# Patient Record
Sex: FEMALE | Race: WHITE | NOT HISPANIC OR LATINO | Employment: OTHER | RURAL
[De-identification: names, ages, dates, MRNs, and addresses within clinical notes are randomized per-mention and may not be internally consistent; named-entity substitution may affect disease eponyms.]

---

## 2020-08-21 ENCOUNTER — HISTORICAL (OUTPATIENT)
Dept: ADMINISTRATIVE | Facility: HOSPITAL | Age: 75
End: 2020-08-21

## 2021-08-27 ENCOUNTER — HOSPITAL ENCOUNTER (OUTPATIENT)
Dept: RADIOLOGY | Facility: HOSPITAL | Age: 76
Discharge: HOME | End: 2021-08-27
Payer: MEDICARE

## 2021-08-27 VITALS — HEIGHT: 64 IN | BODY MASS INDEX: 26.12 KG/M2 | WEIGHT: 153 LBS

## 2021-08-27 DIAGNOSIS — Z12.39 SCREENING FOR BREAST CANCER: ICD-10-CM

## 2021-08-27 PROCEDURE — 77067 SCR MAMMO BI INCL CAD: CPT | Mod: TC

## 2022-01-04 DIAGNOSIS — R21 RASH AND NONSPECIFIC SKIN ERUPTION: Primary | ICD-10-CM

## 2022-09-02 ENCOUNTER — HOSPITAL ENCOUNTER (OUTPATIENT)
Dept: RADIOLOGY | Facility: HOSPITAL | Age: 77
Discharge: HOME | End: 2022-09-02
Attending: FAMILY MEDICINE
Payer: MEDICARE

## 2022-09-02 DIAGNOSIS — Z12.31 ENCOUNTER FOR SCREENING MAMMOGRAM FOR BREAST CANCER: ICD-10-CM

## 2022-09-02 PROCEDURE — 77067 SCR MAMMO BI INCL CAD: CPT | Mod: TC

## 2023-05-16 ENCOUNTER — OFFICE VISIT (OUTPATIENT)
Dept: DERMATOLOGY | Facility: CLINIC | Age: 78
End: 2023-05-16
Payer: MEDICARE

## 2023-05-16 DIAGNOSIS — Z85.828 HISTORY OF NONMELANOMA SKIN CANCER: ICD-10-CM

## 2023-05-16 DIAGNOSIS — D48.5 NEOPLASM OF UNCERTAIN BEHAVIOR OF SKIN: Primary | ICD-10-CM

## 2023-05-16 DIAGNOSIS — L30.9 DERMATITIS, UNSPECIFIED: ICD-10-CM

## 2023-05-16 DIAGNOSIS — L57.0 ACTINIC KERATOSES: ICD-10-CM

## 2023-05-16 PROCEDURE — 17003 DESTRUCT PREMALG LES 2-14: CPT | Mod: ,,, | Performed by: STUDENT IN AN ORGANIZED HEALTH CARE EDUCATION/TRAINING PROGRAM

## 2023-05-16 PROCEDURE — 17003 DESTRUCTION, PREMALIGNANT LESIONS; SECOND THROUGH 14 LESIONS: ICD-10-PCS | Mod: ,,, | Performed by: STUDENT IN AN ORGANIZED HEALTH CARE EDUCATION/TRAINING PROGRAM

## 2023-05-16 PROCEDURE — 88305 TISSUE EXAM BY PATHOLOGIST: CPT | Mod: TC,59,SUR | Performed by: STUDENT IN AN ORGANIZED HEALTH CARE EDUCATION/TRAINING PROGRAM

## 2023-05-16 PROCEDURE — 17000 PR DESTRUCTION(LASER SURGERY,CRYOSURGERY,CHEMOSURGERY),PREMALIGNANT LESIONS,FIRST LESION: ICD-10-PCS | Mod: XS,,, | Performed by: STUDENT IN AN ORGANIZED HEALTH CARE EDUCATION/TRAINING PROGRAM

## 2023-05-16 PROCEDURE — 11103 PR TANGENTIAL BIOPSY, SKIN, EA ADDTL LESION: ICD-10-PCS | Mod: ,,, | Performed by: STUDENT IN AN ORGANIZED HEALTH CARE EDUCATION/TRAINING PROGRAM

## 2023-05-16 PROCEDURE — 88305 TISSUE EXAM BY PATHOLOGIST: CPT | Mod: 26,,, | Performed by: PATHOLOGY

## 2023-05-16 PROCEDURE — 88305 PATHOLOGY, DERMATOLOGY: ICD-10-PCS | Mod: 26,,, | Performed by: PATHOLOGY

## 2023-05-16 PROCEDURE — 99204 PR OFFICE/OUTPT VISIT, NEW, LEVL IV, 45-59 MIN: ICD-10-PCS | Mod: 25,,, | Performed by: STUDENT IN AN ORGANIZED HEALTH CARE EDUCATION/TRAINING PROGRAM

## 2023-05-16 PROCEDURE — 11102 TANGNTL BX SKIN SINGLE LES: CPT | Mod: ,,, | Performed by: STUDENT IN AN ORGANIZED HEALTH CARE EDUCATION/TRAINING PROGRAM

## 2023-05-16 PROCEDURE — 11102 PR TANGENTIAL BIOPSY, SKIN, SINGLE LESION: ICD-10-PCS | Mod: ,,, | Performed by: STUDENT IN AN ORGANIZED HEALTH CARE EDUCATION/TRAINING PROGRAM

## 2023-05-16 PROCEDURE — 87529 MAYO GENERIC ORDERABLE: ICD-10-PCS | Mod: 90,,, | Performed by: CLINICAL MEDICAL LABORATORY

## 2023-05-16 PROCEDURE — 87529 HSV DNA AMP PROBE: CPT | Mod: 90,,, | Performed by: CLINICAL MEDICAL LABORATORY

## 2023-05-16 PROCEDURE — 99204 OFFICE O/P NEW MOD 45 MIN: CPT | Mod: 25,,, | Performed by: STUDENT IN AN ORGANIZED HEALTH CARE EDUCATION/TRAINING PROGRAM

## 2023-05-16 PROCEDURE — 11103 TANGNTL BX SKIN EA SEP/ADDL: CPT | Mod: ,,, | Performed by: STUDENT IN AN ORGANIZED HEALTH CARE EDUCATION/TRAINING PROGRAM

## 2023-05-16 PROCEDURE — 87070 CULTURE, WOUND: ICD-10-PCS | Mod: ,,, | Performed by: CLINICAL MEDICAL LABORATORY

## 2023-05-16 PROCEDURE — 87070 CULTURE OTHR SPECIMN AEROBIC: CPT | Mod: ,,, | Performed by: CLINICAL MEDICAL LABORATORY

## 2023-05-16 PROCEDURE — 17000 DESTRUCT PREMALG LESION: CPT | Mod: XS,,, | Performed by: STUDENT IN AN ORGANIZED HEALTH CARE EDUCATION/TRAINING PROGRAM

## 2023-05-16 RX ORDER — NYSTATIN AND TRIAMCINOLONE ACETONIDE 100000; 1 [USP'U]/G; MG/G
OINTMENT TOPICAL 2 TIMES DAILY
Qty: 60 G | Refills: 3 | Status: SHIPPED | OUTPATIENT
Start: 2023-05-16 | End: 2024-02-15

## 2023-05-16 NOTE — PROGRESS NOTES
[facility]  Ryne Corea MD    [address] [place] [address]  [phone]    Fax: [phone]    Patient Name: Theresa Fam  Medical Record Number: [id number]  PCP: Aden Alfaro MD  Age: 77 y.o. : 1945  Contact: [phone] (home)     CC: rash  History of Present Illness:     Theresa Fam is a 77 y.o.  female with history of skin cancer (20 years ago, NMSC) who presents to clinic today for rash of the mouth.  This has been present for a few months. Symptoms include soreness, tenderness, occasional pruritus. Previous treatments include Abreva, hydrocortisone 2.5%, and Vaseline. Other concerns today are skin lesion of the R nose.     Of note, pt has had patch testing performed with Dr. Baumann with no definitive allergens identified per patient.   The patient has no other concerns today.    Review of Systems:     Unremarkable other than mentioned above.     Physical Exam:     General: Relaxed, oriented, alert    Skin examination of the scalp, face, neck, chest, back, abdomen, upper extremities and lower extremities were normal except for as listed below              Assessment and Plan:     1. History of NMSC   Well-healed scar  No e/o recurrence   Recommend sunscreen and good photoprotection       2. Dermatitis Unspecified  - eczematous patches on upper and lower lip   DDx: intrinsic lip eczema vs ACD vs impetigo    Plan: HSV PCR obtained  - wound culture    - Nystatin- Triamcinolone ointment bid     Counseling  I counseled the patient regarding the following:  Skin care: Patient instructed to use gentle skin care including dove unscented soap, CeraVe moisturizing cream, and fragrance free laundry detergent.  Expectations: The patient understands that there is not a definitive diagnosis at this time. Further testing or empiric therapy may be necessary to diagnose and improve the condition.  Contact office if: The patient develops a fever, or rash dramatically worsens  despite treatment.      3. Actinic Keratoses  Erythematous, scaly papules on L temple, L nasal tip, R arm, L hand and arm     Plan: Liquid Nitrogen.  A total of 8 lesions were treated with liquid nitrogen for 2 freeze-thaw cycles lasting 5 seconds, located on the above locations.   The patient's consent was obtained including but not limited to risks of crusting, scabbing,  blistering, scarring, darker or lighter pigmentary change, recurrence, incomplete removal and infection.    Counseling.  Sun protective clothing and broad spectrum sunscreen can prevent the formation of AK.   AKs can be treated with cryotherapy, photodynamic therapy, imiquimod, topical 5-FU.  Actinic Keratoses are precancerous proliferations that occur within sun damaged skin. If untreated,  a small subset of AKs can develop into Squamous Cell Carcinoma.    4. Neoplasm of Uncertain Behavior   A) R nasal sidewall   - pearly papule located on the R nasal sidewall  Ddx includes: BCC    B) R elbow   - scaly patch on R elbow   BCC vs AK     Shave biopsy  x 2     Pre-procedure Diagnosis: as above  Post_procedure Diagnosis: same  Estimated Blood Loss: <1cc    Findings: None  Complications: None  Specimens: to pathology      Written informed consent was obtained after discussing risks including pain, bleeding, infection, recurrence and scarring. The biopsy site was sterilely prepped with alcohol, which was allowed to dry completely, then locally infiltrated with 1% lidocaine with epinephrine, ~3 cc total. A shave biopsy was obtained using a Dermablade/15 and the specimen was sent to dermatopathology. Aluminum chloride was used for hemostasis. Antibiotic ointment and a clean dressing were applied. The patient tolerated the procedure well without complications. Verbal and written wound care instructions were given.    Ryne Corea MD         Return to clinic in 4 weeks    AVS printed with patient instructions     Ryne Corea MD   Mohs Surgery/Dermatologic  Oncology  Dermatology

## 2023-05-18 LAB
ESTROGEN SERPL-MCNC: NORMAL PG/ML
INSULIN SERPL-ACNC: NORMAL U[IU]/ML
LAB AP GROSS DESCRIPTION: NORMAL
LAB AP LABORATORY NOTES: NORMAL
LAB AP SPEC A DDX: NORMAL
LAB AP SPEC A MORPHOLOGY: NORMAL
LAB AP SPEC A PROCEDURE: NORMAL
LAB AP SPEC B DDX: NORMAL
LAB AP SPEC B MORPHOLOGY: NORMAL
LAB AP SPEC B PROCEDURE: NORMAL
MICROORGANISM SPEC CULT: NORMAL
T3RU NFR SERPL: NORMAL %

## 2023-05-19 LAB — MAYO GENERIC ORDERABLE RESULT: NORMAL

## 2023-05-22 ENCOUNTER — PROCEDURE VISIT (OUTPATIENT)
Dept: DERMATOLOGY | Facility: CLINIC | Age: 78
End: 2023-05-22
Payer: MEDICARE

## 2023-05-22 VITALS — HEART RATE: 72 BPM | SYSTOLIC BLOOD PRESSURE: 131 MMHG | DIASTOLIC BLOOD PRESSURE: 68 MMHG

## 2023-05-22 DIAGNOSIS — D48.9 NEOPLASM OF UNCERTAIN BEHAVIOR: ICD-10-CM

## 2023-05-22 DIAGNOSIS — C44.612 BASAL CELL CARCINOMA (BCC) OF RIGHT ELBOW: ICD-10-CM

## 2023-05-22 DIAGNOSIS — C44.311 BASAL CELL CARCINOMA (BCC) OF RIGHT NASAL SIDEWALL: Primary | ICD-10-CM

## 2023-05-22 PROCEDURE — 99499 UNLISTED E&M SERVICE: CPT | Mod: ,,, | Performed by: STUDENT IN AN ORGANIZED HEALTH CARE EDUCATION/TRAINING PROGRAM

## 2023-05-22 PROCEDURE — 88304 TISSUE EXAM BY PATHOLOGIST: CPT | Mod: TC,SUR | Performed by: STUDENT IN AN ORGANIZED HEALTH CARE EDUCATION/TRAINING PROGRAM

## 2023-05-22 PROCEDURE — 88304 PATHOLOGY, DERMATOLOGY: ICD-10-PCS | Mod: 26,,, | Performed by: PATHOLOGY

## 2023-05-22 PROCEDURE — 17311: ICD-10-PCS | Mod: ,,, | Performed by: STUDENT IN AN ORGANIZED HEALTH CARE EDUCATION/TRAINING PROGRAM

## 2023-05-22 PROCEDURE — 17313: ICD-10-PCS | Mod: XS,,, | Performed by: STUDENT IN AN ORGANIZED HEALTH CARE EDUCATION/TRAINING PROGRAM

## 2023-05-22 PROCEDURE — 14060 PR ADJ TISS XFER LID,NOS,EAR <10 SQCM: ICD-10-PCS | Mod: 51,,, | Performed by: STUDENT IN AN ORGANIZED HEALTH CARE EDUCATION/TRAINING PROGRAM

## 2023-05-22 PROCEDURE — 14060 TIS TRNFR E/N/E/L 10 SQ CM/<: CPT | Mod: 51,,, | Performed by: STUDENT IN AN ORGANIZED HEALTH CARE EDUCATION/TRAINING PROGRAM

## 2023-05-22 PROCEDURE — 12032 PR LAYR CLOS WND TRUNK,ARM,LEG 2.6-7.5 CM: ICD-10-PCS | Mod: XS,51,, | Performed by: STUDENT IN AN ORGANIZED HEALTH CARE EDUCATION/TRAINING PROGRAM

## 2023-05-22 PROCEDURE — 99499 NO LOS: ICD-10-PCS | Mod: ,,, | Performed by: STUDENT IN AN ORGANIZED HEALTH CARE EDUCATION/TRAINING PROGRAM

## 2023-05-22 PROCEDURE — 88304 TISSUE EXAM BY PATHOLOGIST: CPT | Mod: 26,,, | Performed by: PATHOLOGY

## 2023-05-22 PROCEDURE — 17311 MOHS 1 STAGE H/N/HF/G: CPT | Mod: ,,, | Performed by: STUDENT IN AN ORGANIZED HEALTH CARE EDUCATION/TRAINING PROGRAM

## 2023-05-22 PROCEDURE — 17313 MOHS 1 STAGE T/A/L: CPT | Mod: XS,,, | Performed by: STUDENT IN AN ORGANIZED HEALTH CARE EDUCATION/TRAINING PROGRAM

## 2023-05-22 PROCEDURE — 12032 INTMD RPR S/A/T/EXT 2.6-7.5: CPT | Mod: XS,51,, | Performed by: STUDENT IN AN ORGANIZED HEALTH CARE EDUCATION/TRAINING PROGRAM

## 2023-05-22 RX ORDER — MUPIROCIN 20 MG/G
OINTMENT TOPICAL DAILY
Qty: 22 G | Refills: 5 | Status: SHIPPED | OUTPATIENT
Start: 2023-05-22 | End: 2024-02-15

## 2023-05-22 NOTE — PROGRESS NOTES
Mohs Surgery Operative Note    Patient name: Theresa Fam  Date: 05/22/2023    Mohs accession #:   Previous dermpath accession #: [id number]  Location: right nasal sidewall  Preop diagnosis:0.7 x 0.7 cm   Postop diagnosis: Same  Mohs AUC score:   Number of stages: 1  Preop size: 0.7 x 0.7 cm   Postop size: 1.1 x 1.1 cm   Depth of defect: fat   Repair type: Advancement     Surgeon and Pathologist: ALEX Corea MD     Indications for Mohs Surgery    Removal of the patient's tumor is complicated by the following clinical features: Clinical area critical for tissue conservation (Area H: central face, eyelids, eyebrows, nose, lips, chin, ear, periauricular, temple, genitalia, hands, feet, ankles, nail units and areola) and Poorly-defined clinical tumor borders    Based on my medical judgement, Mohs surgery is the most appropriate treatment for this cancer compared to other treatments. I discussed alternative treatments to Mohs surgery and specifically discussed the risks and benefits of curettage, excision with permanent sections, and foregoing treatment. The rationale for Mohs was explained to the patient and consent was obtained. The risks, benefits and alternatives to therapy were discussed in detail. Specifically, the risks of infection, scarring, bleeding, prolonged wound healing, incomplete removal, allergy to anesthesia, nerve injury and recurrence were addressed. Prior to the procedure, the treatment site was clearly identified and confirmed by the patient. All components of Universal Protocol/PAUSE Rule completed. VICTORINA Corea MD operated in two distinct and integrated capacities as the surgeon and pathologist.    STAGE I:  The patient was placed on the operating table. The cancer was identified and outlined. The entire surgical field was prepped with chlorhexidine The surgical site was anesthetized using Lidocaine 1% with epinephrine 1:100,000 buffered with sodium bicarbonate 8.4% in a 1:10 ratio.    The  area of clinically apparent tumor was debulked with a 2 mm curette. The layer of tissue was then surgically excised using a #15 blade and was then transferred onto a specimen sheet maintaining the orientation of the specimen. Hemostasis was obtained using monopolar electrodesiccation. The wound site was then covered with a dressing while the tissue samples were processed for examination.    The specimen was oriented, mapped and divided. Each section was then inked and processed in the Mohs lab using the Mohs protocol and submitted for frozen section. The histopathologic sections were reviewed by the surgeon in conjunction with the reference map.     Total blocks: 1 Total slides: 2    Frozen section analysis revealed: No additional tumor identified on microscopic examination by the surgeon. Histology: No malignant cells seen in the sections examined.    Additional Histologic Findings: none     REPAIR: Advancement Flap    Indication for flap: A flap was chosen because closing the wound by second intention, primary linear closure, or when skin grafting would result in a functionally unsatisfactory result. Additionally, a flap was chosen to recruit additional tissue, displace tension away from the defect and any nearby free margins, as well as to re-orient tension vectors in more favorable directions    Primary Surgeon : ALEX Corea MD  Repair Size: 4 x 2 cm  Sutures:  4-0 monocryl; 5-0 prolene     The defect was identified and a marking pen was used to plan the repair. The area was infiltrated with Lidocaine 1% with epinephrine 1:100,000 buffered with sodium bicarbonate 8.4% in a 1:10 ratio, prepped with chlorhexidine and draped with sterile towels.  The flap was incised using a #15 blade to adipose and undermined widely. The defect was debeveled and undermined and an adjacent standing cone was removed. Hemostasis was obtained using monopolar electrodesiccation. The flap was advanced and secured with buried vertical  mattress sutures placed in the dermis and subcutaneous tissue.  Percutaneous simple running sutures were carefully placed for maximum eversion and meticulous wound edge approximation. Careful attention was paid to avoid distorting any nearby free margins.  The wound was cleansed with saline and ointment was applied along the wound surface. A sterile pressure dressing was applied. Wound care instructions were given verbally and in writing. The patient left the operating suite in stable condition. Patient was informed that additional refinement of the resulting surgical scar may be used as a second stage of this reconstruction.    Ryne Corea MD   Mohs Surgery/Dermatologic Oncology

## 2023-05-22 NOTE — PROGRESS NOTES
Mohs Surgery Operative Note    Patient name: Theresa Fam  Date: 05/22/2023    Mohs accession #:   Previous dermpath accession #: [id number]  Location: right elbow  Preop diagnosis:BCC  Postop diagnosis: Same  Mohs AUC score: 7  Number of stages: 1  Preop size: 2.1 x 2.0   Postop size: 2.6  x 2.5  Depth of defect: Fat   Repair type: Linear      Surgeon and Pathologist: ALEX Corea MD     Indications for Mohs Surgery    Removal of the patient's tumor is complicated by the following clinical features: Large tumor size and Poorly-defined clinical tumor borders    Based on my medical judgement, Mohs surgery is the most appropriate treatment for this cancer compared to other treatments. I discussed alternative treatments to Mohs surgery and specifically discussed the risks and benefits of curettage, excision with permanent sections, and foregoing treatment. The rationale for Mohs was explained to the patient and consent was obtained. The risks, benefits and alternatives to therapy were discussed in detail. Specifically, the risks of infection, scarring, bleeding, prolonged wound healing, incomplete removal, allergy to anesthesia, nerve injury and recurrence were addressed. Prior to the procedure, the treatment site was clearly identified and confirmed by the patient. All components of Universal Protocol/PAUSE Rule completed. VICTORINA Corea MD operated in two distinct and integrated capacities as the surgeon and pathologist.    STAGE I:  The patient was placed on the operating table. The cancer was identified and outlined. The entire surgical field was prepped with chlorhexidine The surgical site was anesthetized using Lidocaine 1% with epinephrine 1:100,000 buffered with sodium bicarbonate 8.4% in a 1:10 ratio.    The area of clinically apparent tumor was debulked with a 2 mm curette. The layer of tissue was then surgically excised using a #15 blade and was then transferred onto a specimen sheet maintaining the  orientation of the specimen. Hemostasis was obtained using monopolar electrodesiccation. The wound site was then covered with a dressing while the tissue samples were processed for examination.    The specimen was oriented, mapped and divided. Each section was then inked and processed in the Mohs lab using the Mohs protocol and submitted for frozen section. The histopathologic sections were reviewed by the surgeon in conjunction with the reference map.     Total blocks: 1 Total slides: 3    Frozen section analysis revealed: No additional tumor identified on microscopic examination by the surgeon. Histology: No malignant cells seen in the sections examined.    Additional Histologic Findings: none     REPAIR: Intermediate Closure    Primary Surgeon : ALEX Corea MD  Repair Size: 6 cm  Sutures:  3-0 PDS; 4-0 prolene     The defect was identified and a marking pen was used to plan the repair. The area was infiltrated with Lidocaine 1% with epinephrine 1:100,000 buffered with sodium bicarbonate 8.4% in a 1:10 ratio, prepped with chlorhexidine and draped with sterile towels.  The wound was debeveled and undermined widely. Cones were excised within relaxed skin tension lines on both sides of the defect. Hemostasis was obtained using monopolar electrodesiccation. The dermis and subcutaneous tissue were then approximated using buried vertical mattress sutures. Percutaneous simple running sutures were carefully placed for maximum eversion and meticulous wound edge approximation. Careful attention was paid to avoid distorting any nearby free margins.  The wound was cleansed with saline and ointment was applied along the wound surface. A sterile pressure dressing was applied. Wound care instructions were given verbally and in writing. The patient left the operating suite in stable condition. Patient was informed that additional refinement of the resulting surgical scar may be used as a second stage of this  reconstruction.    Ryne Corea MD   Mohs Surgery/Dermatologic Oncology

## 2023-05-22 NOTE — PROGRESS NOTES
Mohs Surgery Consult Note    Theresa Fam is a 77 y.o. female who is referred by Dr. Corea for evaluation of a BCC x2 on the R nasal sidewall and R elbow.     Recurrent skin cancer: No    Preoperative Risk Factors:  Current Anticoagulants: No  Endocarditis / Rheumatic Fever hx: No  Immunocompromised: No  Prosthetic joint: No  Congenital heart defect: No  Prosthetic heart valve: No  Diabetic: No  Transplant: No  Pacemaker: No  Defibrillator:  No  Prior problem with local anesthesia: No  Tobacco History: No]  Clindamycin Allergy: No  Pregnant: no     Transmissible Diseases:  HIV No  Hepatitis B or C  No      Exam:  Limited skin exam is normal except for a  BCC x2  located on the R nasal sidewall and R elbow  .    Pathologic Findings:  Accession # [id number]   Diagnosis: BCC x2    Assessment and Plan:  Treatment Options : The various treatment options for skin cancer removal were reviewed with the patient in detail. These include Mohs surgery with its high cure rate, excisional surgery, destructive treatment, radiation therapy, and various topical therapies.  Given the indications and high cure rate, the patient has agreed to proceed with Mohs.  Risks and Benefits : The rationale for Mohs was explained to the patient. The risks and benefits to therapy were discussed in detail. Specifically, the risks of infection, scarring, bleeding, dehiscence, hematoma, prolonged wound healing, incomplete removal, allergy to anesthesia, nerve injury, inability to clear the tumor, and recurrence were addressed. The treatment site was clearly identified and confirmed by the patient.    Plan:  Mohs Micrographic Surgery x 2  Indication for antibiotics: Mupirocin ointment bid x 7 days       Of note, an incidental lipoma was removed from the R arm defect and will be sent in for pathology     Ryne Corea MD   Mohs Surgery/Dermatologic Oncology

## 2023-05-22 NOTE — PATIENT INSTRUCTIONS

## 2023-05-24 LAB
ESTROGEN SERPL-MCNC: NORMAL PG/ML
INSULIN SERPL-ACNC: NORMAL U[IU]/ML
LAB AP GROSS DESCRIPTION: NORMAL
LAB AP LABORATORY NOTES: NORMAL
LAB AP SPEC A DDX: NORMAL
LAB AP SPEC A MORPHOLOGY: NORMAL
LAB AP SPEC A PROCEDURE: NORMAL
T3RU NFR SERPL: NORMAL %

## 2023-05-25 ENCOUNTER — CLINICAL SUPPORT (OUTPATIENT)
Dept: DERMATOLOGY | Facility: CLINIC | Age: 78
End: 2023-05-25
Payer: MEDICARE

## 2023-05-25 DIAGNOSIS — Z51.89 VISIT FOR WOUND CHECK: Primary | ICD-10-CM

## 2023-05-25 NOTE — PROGRESS NOTES
Patient is here for wound check related to suspected drainage. Incision is healing well no s/s of infection noted. Patient is to return to clinic 06/01/23 for SR.    - Olivia'Con Javier LPN/IVC

## 2023-06-01 ENCOUNTER — CLINICAL SUPPORT (OUTPATIENT)
Dept: DERMATOLOGY | Facility: CLINIC | Age: 78
End: 2023-06-01
Payer: MEDICARE

## 2023-06-01 DIAGNOSIS — L57.0 ACTINIC KERATOSES: Primary | ICD-10-CM

## 2023-06-01 DIAGNOSIS — Z48.02 ENCOUNTER FOR REMOVAL OF SUTURES: ICD-10-CM

## 2023-06-01 PROCEDURE — 17000 DESTRUCT PREMALG LESION: CPT | Mod: ,,, | Performed by: STUDENT IN AN ORGANIZED HEALTH CARE EDUCATION/TRAINING PROGRAM

## 2023-06-01 PROCEDURE — 99024 POSTOP FOLLOW-UP VISIT: CPT | Mod: ,,, | Performed by: STUDENT IN AN ORGANIZED HEALTH CARE EDUCATION/TRAINING PROGRAM

## 2023-06-01 PROCEDURE — 17003 DESTRUCTION, PREMALIGNANT LESIONS; SECOND THROUGH 14 LESIONS: ICD-10-PCS | Mod: ,,, | Performed by: STUDENT IN AN ORGANIZED HEALTH CARE EDUCATION/TRAINING PROGRAM

## 2023-06-01 PROCEDURE — 17000 PR DESTRUCTION(LASER SURGERY,CRYOSURGERY,CHEMOSURGERY),PREMALIGNANT LESIONS,FIRST LESION: ICD-10-PCS | Mod: ,,, | Performed by: STUDENT IN AN ORGANIZED HEALTH CARE EDUCATION/TRAINING PROGRAM

## 2023-06-01 PROCEDURE — 17003 DESTRUCT PREMALG LES 2-14: CPT | Mod: ,,, | Performed by: STUDENT IN AN ORGANIZED HEALTH CARE EDUCATION/TRAINING PROGRAM

## 2023-06-01 PROCEDURE — 99024 PR POST-OP FOLLOW-UP VISIT: ICD-10-PCS | Mod: ,,, | Performed by: STUDENT IN AN ORGANIZED HEALTH CARE EDUCATION/TRAINING PROGRAM

## 2023-06-01 NOTE — PROGRESS NOTES
Mohs accession #:   Previous dermpath accession #: [id number]  Location: right elbow  Preop diagnosis:BCC  Postop diagnosis: Same  Mohs AUC score: 7  Number of stages: 1  Preop size: 2.1 x 2.0   Postop size: 2.6  x 2.5  Depth of defect: Fat   Repair type: Linear        Previous dermpath accession #: [id number]  Location: right nasal sidewall  Preop diagnosis:0.7 x 0.7 cm   Postop diagnosis: Same  Mohs AUC score:   Number of stages: 1  Preop size: 0.7 x 0.7 cm   Postop size: 1.1 x 1.1 cm   Depth of defect: fat   Repair type: Advancement     Sutures removed with no complications   RTC in 6 weeks for nurse visit

## 2023-07-13 ENCOUNTER — CLINICAL SUPPORT (OUTPATIENT)
Dept: DERMATOLOGY | Facility: CLINIC | Age: 78
End: 2023-07-13
Payer: MEDICARE

## 2023-07-13 DIAGNOSIS — Z48.89 ENCOUNTER FOR POST SURGICAL WOUND CHECK: Primary | ICD-10-CM

## 2023-07-13 NOTE — PROGRESS NOTES
Date: 05/22/2023     Mohs accession #:   Previous dermpath accession #: [id number]  Location: right elbow  Preop diagnosis:BCC  Postop diagnosis: Same  Mohs AUC score: 7  Number of stages: 1  Preop size: 2.1 x 2.0   Postop size: 2.6  x 2.5  Depth of defect: Fat   Repair type: Linear      Date: 05/22/2023     Mohs accession #:   Previous dermpath accession #: [id number]  Location: right nasal sidewall  Preop diagnosis:0.7 x 0.7 cm   Postop diagnosis: Same  Mohs AUC score:   Number of stages: 1  Preop size: 0.7 x 0.7 cm   Postop size: 1.1 x 1.1 cm   Depth of defect: fat   Repair type: Advancement     Incision sites healed well. Hypertrophic scar on right nasal sidewall. ILK injected to hypertrophic scar.     Plan: Intralesional Kenalog    Treatment Number: 1   Lesions Injected: 1  Medication Injected: 10 mg/cc of Kenalog  Total Volume Injected: 0.5 cc  Lot Number: [id number]  Expiration Date: 9/2024  NDC #: 5917-9686-36  Administered by: Ryne Corea MD     The risks of atrophy were reviewed with the patient.    Itzel Salas RN

## 2023-08-23 ENCOUNTER — OFFICE VISIT (OUTPATIENT)
Dept: OTOLARYNGOLOGY | Facility: CLINIC | Age: 78
End: 2023-08-23
Payer: MEDICARE

## 2023-08-23 VITALS — HEIGHT: 65 IN | WEIGHT: 164 LBS | BODY MASS INDEX: 27.32 KG/M2

## 2023-08-23 DIAGNOSIS — R05.3 CHRONIC COUGH: Primary | ICD-10-CM

## 2023-08-23 DIAGNOSIS — K21.9 GASTROESOPHAGEAL REFLUX DISEASE WITHOUT ESOPHAGITIS: ICD-10-CM

## 2023-08-23 PROCEDURE — 99213 OFFICE O/P EST LOW 20 MIN: CPT | Mod: PBBFAC | Performed by: OTOLARYNGOLOGY

## 2023-08-23 PROCEDURE — 99204 PR OFFICE/OUTPT VISIT, NEW, LEVL IV, 45-59 MIN: ICD-10-PCS | Mod: S$PBB,,, | Performed by: OTOLARYNGOLOGY

## 2023-08-23 PROCEDURE — 99204 OFFICE O/P NEW MOD 45 MIN: CPT | Mod: S$PBB,,, | Performed by: OTOLARYNGOLOGY

## 2023-08-23 RX ORDER — CLINDAMYCIN HYDROCHLORIDE 300 MG/1
300 CAPSULE ORAL 2 TIMES DAILY
Qty: 28 CAPSULE | Refills: 0 | Status: SHIPPED | OUTPATIENT
Start: 2023-08-23 | End: 2024-02-15

## 2023-08-23 NOTE — PROGRESS NOTES
Subjective:       Patient ID: Theresa Fam is a 78 y.o. female.    Chief Complaint: Cough (Patient states she has had a cough since October of 2022. She has been taking antibiotics and protonix.)  Worse last 2 months  Cough  Associated symptoms include a sore throat.     Review of Systems   HENT:  Positive for sore throat.    Respiratory:  Positive for cough.    All other systems reviewed and are negative.      Objective:      Physical Exam  General: NAD  Head: Normocephalic, atraumatic, no facial asymmetry/normal strength,  Ears: Both auricules normal in appearance, w/o deformities tympanic membranes normal external auditory canals normal  Nose: External nose w/o deformities normal turbinates no drainage or inflammation  Oral Cavity: Lips, gums, floor of mouth, tongue hard palate, and buccal mucosa without mass/lesion  Oropharynx: Mucosa pink and moist, soft palate, posterior pharynx and oropharyngeal wall without mass/lesion  Neck: Supple, symmetric, trachea midline, no palpable mass/lesion, no palpable cervical lymphadenopathy  Skin: Warm and dry, no concerning lesions  Respiratory: Respirations even, unlabored   Assessment:       1. Chronic cough    2. Gastroesophageal reflux disease without esophagitis        Plan:       Discussed possible origins of cough most likely related to GERD  will increase protonix cleocin for any pharyngitis   Discussed diet modifications to help reflux     Recommend seeing pulmonary which is already scheduled

## 2023-09-08 ENCOUNTER — HOSPITAL ENCOUNTER (OUTPATIENT)
Dept: RADIOLOGY | Facility: HOSPITAL | Age: 78
Discharge: HOME | End: 2023-09-08
Attending: FAMILY MEDICINE
Payer: MEDICARE

## 2023-09-08 VITALS — HEIGHT: 65 IN | BODY MASS INDEX: 27.16 KG/M2 | WEIGHT: 163 LBS

## 2023-09-08 DIAGNOSIS — Z12.31 OTHER SCREENING MAMMOGRAM: ICD-10-CM

## 2023-09-08 PROCEDURE — 77067 SCR MAMMO BI INCL CAD: CPT | Mod: TC

## 2023-11-25 ENCOUNTER — OFFICE VISIT (OUTPATIENT)
Dept: FAMILY MEDICINE | Facility: CLINIC | Age: 78
End: 2023-11-25
Payer: MEDICARE

## 2023-11-25 VITALS
WEIGHT: 163 LBS | HEIGHT: 65 IN | SYSTOLIC BLOOD PRESSURE: 157 MMHG | TEMPERATURE: 100 F | OXYGEN SATURATION: 95 % | BODY MASS INDEX: 27.16 KG/M2 | DIASTOLIC BLOOD PRESSURE: 80 MMHG | RESPIRATION RATE: 20 BRPM | HEART RATE: 85 BPM

## 2023-11-25 DIAGNOSIS — H10.9 CONJUNCTIVITIS OF BOTH EYES, UNSPECIFIED CONJUNCTIVITIS TYPE: Primary | ICD-10-CM

## 2023-11-25 PROCEDURE — 99204 OFFICE O/P NEW MOD 45 MIN: CPT | Mod: ,,, | Performed by: FAMILY MEDICINE

## 2023-11-25 PROCEDURE — 99204 PR OFFICE/OUTPT VISIT, NEW, LEVL IV, 45-59 MIN: ICD-10-PCS | Mod: ,,, | Performed by: FAMILY MEDICINE

## 2023-11-25 RX ORDER — PANTOPRAZOLE SODIUM 40 MG/1
40 TABLET, DELAYED RELEASE ORAL DAILY
COMMUNITY
Start: 2023-10-03

## 2023-11-25 RX ORDER — MOXIFLOXACIN 5 MG/ML
1 SOLUTION/ DROPS OPHTHALMIC 3 TIMES DAILY
Qty: 3 ML | Refills: 0 | Status: SHIPPED | OUTPATIENT
Start: 2023-11-25 | End: 2023-11-30

## 2023-11-25 RX ORDER — LEVOTHYROXINE SODIUM 50 UG/1
50 TABLET ORAL
COMMUNITY
Start: 2023-09-12

## 2023-11-25 RX ORDER — BENZONATATE 100 MG/1
100 CAPSULE ORAL 3 TIMES DAILY PRN
Qty: 20 CAPSULE | Refills: 0 | Status: SHIPPED | OUTPATIENT
Start: 2023-11-25 | End: 2024-02-15

## 2023-11-25 RX ORDER — OFLOXACIN 3 MG/ML
1 SOLUTION/ DROPS OPHTHALMIC 4 TIMES DAILY
Qty: 10 ML | Refills: 0 | Status: SHIPPED | OUTPATIENT
Start: 2023-11-25 | End: 2023-12-02

## 2023-11-25 RX ORDER — PREDNISONE 10 MG/1
20 TABLET ORAL DAILY
COMMUNITY
Start: 2023-11-20

## 2023-11-25 NOTE — PROGRESS NOTES
Subjective:       Patient ID: Theresa Fam is a 78 y.o. female.    Chief Complaint: Conjunctivitis (Pink eye in Right eye. Currently using OTC gtts, not helping) and Cough (Has chronic cough, currently taking Prednisone)    Conjunctivitis  Associated symptoms include coughing. Pertinent negatives include no abdominal pain, arthralgias, change in bowel habit, chest pain, chills, congestion, diaphoresis, fatigue, fever, headaches, joint swelling, myalgias, nausea, neck pain, numbness, rash, sore throat, vertigo, vomiting or weakness.   Cough  Pertinent negatives include no chest pain, chills, ear pain, fever, headaches, myalgias, postnasal drip, rash, rhinorrhea, sore throat, shortness of breath or wheezing. There is no history of environmental allergies.     Review of Systems   Constitutional:  Negative for activity change, appetite change, chills, diaphoresis, fatigue, fever and unexpected weight change.   HENT:  Negative for nasal congestion, dental problem, drooling, ear discharge, ear pain, facial swelling, hearing loss, mouth sores, nosebleeds, postnasal drip, rhinorrhea, sinus pressure/congestion, sneezing, sore throat, tinnitus, trouble swallowing, voice change and goiter.    Eyes:  Positive for discharge and itching. Negative for photophobia and visual disturbance.   Respiratory:  Positive for cough. Negative for apnea, choking, chest tightness, shortness of breath, wheezing and stridor.    Cardiovascular:  Negative for chest pain, palpitations, leg swelling and claudication.   Gastrointestinal:  Negative for abdominal distention, abdominal pain, anal bleeding, blood in stool, change in bowel habit, constipation, diarrhea, nausea and vomiting.   Endocrine: Negative for cold intolerance, heat intolerance, polydipsia, polyphagia and polyuria.   Genitourinary:  Negative for bladder incontinence, decreased urine volume, difficulty urinating, dysuria, enuresis, flank pain, frequency, hematuria, nocturia, pelvic pain  and urgency.   Musculoskeletal:  Negative for arthralgias, back pain, gait problem, joint swelling, leg pain, myalgias, neck pain, neck stiffness and joint deformity.   Integumentary:  Negative for pallor, rash, wound, breast mass and breast tenderness.   Allergic/Immunologic: Negative for environmental allergies, food allergies and immunocompromised state.   Neurological:  Negative for dizziness, vertigo, tremors, seizures, syncope, facial asymmetry, speech difficulty, weakness, light-headedness, numbness, headaches, memory loss and coordination difficulties.   Hematological:  Negative for adenopathy. Does not bruise/bleed easily.   Psychiatric/Behavioral:  Negative for agitation, behavioral problems, confusion, decreased concentration, dysphoric mood, hallucinations, self-injury, sleep disturbance and suicidal ideas. The patient is not nervous/anxious and is not hyperactive.    Breast: Negative for mass and tenderness        Objective:      Physical Exam  Vitals reviewed.   Constitutional:       Appearance: Normal appearance.   HENT:      Head: Normocephalic and atraumatic.      Right Ear: Tympanic membrane, ear canal and external ear normal.      Left Ear: Tympanic membrane, ear canal and external ear normal.      Nose: Nose normal.      Mouth/Throat:      Mouth: Mucous membranes are moist.      Pharynx: Oropharynx is clear.   Eyes:      General:         Right eye: Discharge present.         Left eye: Discharge present.     Extraocular Movements: Extraocular movements intact.      Pupils: Pupils are equal, round, and reactive to light.   Cardiovascular:      Rate and Rhythm: Normal rate and regular rhythm.      Pulses: Normal pulses.      Heart sounds: Normal heart sounds.   Pulmonary:      Effort: Pulmonary effort is normal.      Breath sounds: Normal breath sounds.   Abdominal:      General: Bowel sounds are normal.      Palpations: Abdomen is soft.   Musculoskeletal:         General: Normal range of motion.       Cervical back: Normal range of motion and neck supple.   Skin:     General: Skin is warm and dry.   Neurological:      General: No focal deficit present.      Mental Status: She is alert. Mental status is at baseline.   Psychiatric:         Mood and Affect: Mood normal.         Behavior: Behavior normal.         Thought Content: Thought content normal.         Judgment: Judgment normal.         Assessment:       1. Conjunctivitis of both eyes, unspecified conjunctivitis type        Plan:     Conjunctivitis of both eyes, unspecified conjunctivitis type    Other orders  -     ofloxacin (OCUFLOX) 0.3 % ophthalmic solution; Place 1 drop into both eyes 4 (four) times daily. for 7 days  Dispense: 10 mL; Refill: 0  -     benzonatate (TESSALON) 100 MG capsule; Take 1 capsule (100 mg total) by mouth 3 (three) times daily as needed for Cough.  Dispense: 20 capsule; Refill: 0

## 2024-01-02 ENCOUNTER — OFFICE VISIT (OUTPATIENT)
Dept: DERMATOLOGY | Facility: CLINIC | Age: 79
End: 2024-01-02
Payer: MEDICARE

## 2024-01-02 DIAGNOSIS — Z85.828 HISTORY OF NONMELANOMA SKIN CANCER: ICD-10-CM

## 2024-01-02 DIAGNOSIS — L57.0 ACTINIC KERATOSES: Primary | ICD-10-CM

## 2024-01-02 DIAGNOSIS — L72.0 MILIA: ICD-10-CM

## 2024-01-02 DIAGNOSIS — L82.1 SEBORRHEIC KERATOSES: ICD-10-CM

## 2024-01-02 DIAGNOSIS — L91.0 HYPERTROPHIC SCAR: ICD-10-CM

## 2024-01-02 PROCEDURE — 17000 DESTRUCT PREMALG LESION: CPT | Mod: ,,, | Performed by: STUDENT IN AN ORGANIZED HEALTH CARE EDUCATION/TRAINING PROGRAM

## 2024-01-02 PROCEDURE — 99213 OFFICE O/P EST LOW 20 MIN: CPT | Mod: 25,,, | Performed by: STUDENT IN AN ORGANIZED HEALTH CARE EDUCATION/TRAINING PROGRAM

## 2024-01-02 PROCEDURE — 11900 INJECT SKIN LESIONS </W 7: CPT | Mod: XS,,, | Performed by: STUDENT IN AN ORGANIZED HEALTH CARE EDUCATION/TRAINING PROGRAM

## 2024-01-02 PROCEDURE — 17003 DESTRUCT PREMALG LES 2-14: CPT | Mod: ,,, | Performed by: STUDENT IN AN ORGANIZED HEALTH CARE EDUCATION/TRAINING PROGRAM

## 2024-01-02 RX ORDER — AZITHROMYCIN 250 MG/1
250 TABLET, FILM COATED ORAL
COMMUNITY
Start: 2023-12-29 | End: 2024-02-22 | Stop reason: ALTCHOICE

## 2024-01-02 NOTE — PROGRESS NOTES
[facility]  Ryne Corea MD    [address] [place], [place] [address] [place] [address]  [phone]    Fax: [phone]    Patient Name: Theresa Fam  Medical Record Number: [id number]  PCP: Aden Alfaro MD  Age: 78 y.o. : 1945  Contact: [phone] (home)     History of Present Illness:     Theresa Fam is a 78 y.o.  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 Patient is concerned with lesion on left cheek today.     The patient has no other concerns today.    Review of Systems:     Unremarkable other than mentioned above.     Physical Exam:     General: Relaxed, oriented, alert    Skin examination of the scalp, face, neck, chest, back, abdomen, upper extremities and lower extremities were normal except for as listed below      Assessment and Plan:     1. 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    3. Seborrheic keratoses   - brown stuck on appearing papules/plaques  - patient educated on benign nature. No treatment necessary unless they become irritated or inflamed     4. Actinic Keratoses  Erythematous, scaly papules on right arm, left hand, left arm    Plan: Liquid Nitrogen.  A total of 5 lesions were treated with liquid nitrogen for 2 freeze-thaw cycles lasting 5 seconds, located on the above locations.   The patient's consent was obtained including but not limited to risks of crusting, scabbing,  blistering, scarring, darker or lighter pigmentary change, recurrence,  incomplete removal and infection.    Counseling.  Sun protective clothing and broad spectrum sunscreen can prevent the formation of AK.   AKs can be treated with cryotherapy, photodynamic therapy, imiquimod, topical 5-FU.  Actinic Keratoses are precancerous proliferations that occur within sun damaged skin. If untreated,  a small subset of AKs can develop into Squamous Cell Carcinoma.    5. Milia   - yellow-white cystic papules located on the right nasal sidewall.    Plan: Extractions  Comedonal Lesions located on the above location were removed with an 11 blade and q-tip. Prior to removal the treatment areas were prepped in the usual fashion. Consent was obtained and risks were reviewed including but not limited to scarring, infection, bleeding, scabbing, incomplete removal, and allergy to anesthesia.    Return to clinic in 6 months.     AVS printed with patient instructions     Ryne Corea MD   Mohs Surgery/Dermatologic Oncology  Dermatology

## 2024-02-07 RX ORDER — CHLORPHENIRAMINE MALEATE AND PHENYLEPHRINE HYDROCHLORIDE 4; 10 MG/1; MG/1
1 TABLET, COATED ORAL 2 TIMES DAILY
COMMUNITY
Start: 2023-08-18

## 2024-02-07 RX ORDER — ALBUTEROL SULFATE 90 UG/1
2 AEROSOL, METERED RESPIRATORY (INHALATION)
COMMUNITY
Start: 2023-11-20 | End: 2024-04-18 | Stop reason: SDUPTHER

## 2024-02-07 RX ORDER — DOXYCYCLINE 100 MG/1
100 CAPSULE ORAL 2 TIMES DAILY
COMMUNITY
Start: 2023-11-28 | End: 2024-02-22 | Stop reason: ALTCHOICE

## 2024-02-07 RX ORDER — CHLOPHEDIANOL HCL AND PYRILAMINE MALEATE 12.5; 12.5 MG/5ML; MG/5ML
5 SOLUTION ORAL 3 TIMES DAILY
COMMUNITY
Start: 2023-12-01 | End: 2024-02-22

## 2024-02-15 ENCOUNTER — OFFICE VISIT (OUTPATIENT)
Dept: INTERNAL MEDICINE | Facility: CLINIC | Age: 79
End: 2024-02-15
Payer: MEDICARE

## 2024-02-15 VITALS
DIASTOLIC BLOOD PRESSURE: 66 MMHG | OXYGEN SATURATION: 97 % | HEIGHT: 65 IN | WEIGHT: 156 LBS | SYSTOLIC BLOOD PRESSURE: 134 MMHG | HEART RATE: 83 BPM | TEMPERATURE: 98 F | RESPIRATION RATE: 16 BRPM | BODY MASS INDEX: 25.99 KG/M2

## 2024-02-15 DIAGNOSIS — K21.9 GASTROESOPHAGEAL REFLUX DISEASE, UNSPECIFIED WHETHER ESOPHAGITIS PRESENT: ICD-10-CM

## 2024-02-15 DIAGNOSIS — Z76.89 ENCOUNTER TO ESTABLISH CARE WITH NEW DOCTOR: Primary | ICD-10-CM

## 2024-02-15 DIAGNOSIS — E03.9 HYPOTHYROIDISM, UNSPECIFIED TYPE: ICD-10-CM

## 2024-02-15 DIAGNOSIS — J82.83 EOSINOPHILIC ASTHMA: ICD-10-CM

## 2024-02-15 PROCEDURE — 99215 OFFICE O/P EST HI 40 MIN: CPT | Mod: PBBFAC | Performed by: INTERNAL MEDICINE

## 2024-02-15 PROCEDURE — 99205 OFFICE O/P NEW HI 60 MIN: CPT | Mod: S$PBB,,, | Performed by: INTERNAL MEDICINE

## 2024-02-15 NOTE — LETTER
AUTHORIZATION FOR RELEASE OF   CONFIDENTIAL INFORMATION    Dear [facility],    We are seeing Theresa Fam, date of birth 1945, in the clinic at [facility]. Ho Brady DO is the patient's PCP. Theresa Fam has an outstanding lab/procedure at the time we reviewed her chart. In order to help keep her health information updated, she has authorized us to request the following medical record(s):        (  )  MAMMOGRAM                                      (  )  COLONOSCOPY      (  )  PAP SMEAR                                          (  )  OUTSIDE LAB RESULTS     (  )  DEXA SCAN                                          (  )  EYE EXAM            (  )  FOOT EXAM                                          (  )  ENTIRE RECORD     (  )  OUTSIDE IMMUNIZATIONS                 ( X )  PFT and any pulmonary records_________         Please fax records to Ho Brady DO, [phone]     If you have any questions, please contact Lita Rangel LPN at [phone].           Patient Name: Theresa Fam  : 1945  Patient Phone #: [phone]

## 2024-02-15 NOTE — LETTER
AUTHORIZATION FOR RELEASE OF   CONFIDENTIAL INFORMATION    Dear [facility],    We are seeing Theresa Fam, date of birth 1945, in the clinic at [facility]. Ho Brady DO is the patient's PCP. Theresa Fam has an outstanding lab/procedure at the time we reviewed her chart. In order to help keep her health information updated, she has authorized us to request the following medical record(s):        (  )  MAMMOGRAM                                      (  )  COLONOSCOPY      (  )  PAP SMEAR                                          (  )  OUTSIDE LAB RESULTS     (  )  DEXA SCAN                                          (  )  EYE EXAM            (  )  FOOT EXAM                                          ( X )  ENTIRE RECORD     (  )  OUTSIDE IMMUNIZATIONS                 (  )  _______________         Please fax records to [facility], Swift, Christopher W, DO, [phone]      If you have any questions, please contact Lita Rangel LPN at [phone].           Patient Name: Theresa Fam  : 1945  Patient Phone #: [phone]

## 2024-02-15 NOTE — PROGRESS NOTES
Subjective     Patient ID: Theresa Fam is a 78 y.o. female.    Chief Complaint: Establish Care and Cough (Ongoing for months, several ED visits. )    The patient is a 78-year-old female the presents today to establish care.  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  All medical care up until this point has been provided outside of our health system so we are unable to see any records.  She states that about 3 years ago she developed this diffuse rash.  She saw several different doctors and ended up at the [facility].  From there she was started on Xolair injections.  About a year ago she started developing a cough and some wheezing.  She was diagnosed with asthma.  She states that she has had PFTs done and she has seen pulmonary in the past.  Over the last 6 months she has been to the ER 3 times.  The latest ER visit was last night.  She believes her asthma was triggered by the cold air.  She developed wheezing and shortness a breath.  She was treated with Solu-Medrol and albuterol inhale and released from the ER.  She states that she is doing better today.  She states that she has these periods like last night.  She will get started on steroids and improves.  Shortly after discontinuing the steroids the exacerbation starts back up.  She was started on Trelegy about 2 weeks ago.  O2 sat is 97% on room air today.  She is up-to-date on mammogram and colonoscopy.  We have discussed age-appropriate vaccines to include pneumonia vaccine, shingles vaccine and RSV.  She is currently afebrile and vital signs are stable.    Cough  Pertinent negatives include no chest pain, chills, ear pain, eye redness, fever, headaches, myalgias, rash, sore throat, shortness of breath or wheezing.     Review of Systems   Constitutional:  Negative for appetite change, chills, fatigue and fever.   HENT:  Negative for nasal congestion, ear pain, hearing loss, sinus pressure/congestion and  sore throat.    Eyes:  Negative for pain, redness and visual disturbance.   Respiratory:  Positive for cough. Negative for apnea, shortness of breath and wheezing.    Cardiovascular:  Negative for chest pain and palpitations.   Gastrointestinal:  Negative for abdominal pain, constipation, diarrhea and nausea.   Endocrine: Negative for cold intolerance, heat intolerance and polyuria.   Genitourinary:  Negative for dysuria and hematuria.   Musculoskeletal:  Negative for arthralgias, back pain, joint swelling, myalgias and neck pain.   Integumentary:  Negative for pallor, rash and wound.   Allergic/Immunologic: Negative for immunocompromised state.   Neurological:  Negative for tremors, seizures, weakness, headaches and memory loss.   Hematological:  Negative for adenopathy.   Psychiatric/Behavioral:  Negative for confusion, dysphoric mood and sleep disturbance. The patient is not nervous/anxious.           Objective     Physical Exam  Vitals and nursing note reviewed.   Constitutional:       General: She is not in acute distress.     Appearance: Normal appearance. She is not ill-appearing.   HENT:      Head: Normocephalic and atraumatic.      Right Ear: External ear normal.      Left Ear: External ear normal.      Nose: Nose normal.      Mouth/Throat:      Pharynx: Oropharynx is clear.   Eyes:      Extraocular Movements: Extraocular movements intact.      Conjunctiva/sclera: Conjunctivae normal.      Pupils: Pupils are equal, round, and reactive to light.   Cardiovascular:      Rate and Rhythm: Normal rate and regular rhythm.      Pulses: Normal pulses.      Heart sounds: Normal heart sounds. No murmur heard.  Pulmonary:      Effort: No respiratory distress.      Breath sounds: Normal breath sounds. No wheezing or rales.   Abdominal:      General: Bowel sounds are normal.      Palpations: Abdomen is soft.   Musculoskeletal:         General: Normal range of motion.      Cervical back: Normal range of motion and neck  supple.      Right lower leg: No edema.      Left lower leg: No edema.   Skin:     General: Skin is warm and dry.      Capillary Refill: Capillary refill takes less than 2 seconds.      Coloration: Skin is not pale.   Neurological:      General: No focal deficit present.      Mental Status: She is alert and oriented to person, place, and time.      Cranial Nerves: No cranial nerve deficit.      Sensory: No sensory deficit.      Motor: No weakness.      Gait: Gait normal.   Psychiatric:         Mood and Affect: Mood normal.         Judgment: Judgment normal.            Assessment and Plan     1. Encounter to establish care with new doctor    2. Gastroesophageal reflux disease, unspecified whether esophagitis present    3. Hypothyroidism, unspecified type    4. Eosinophilic asthma  -     Ambulatory referral/consult to Pulmonology; Future; Expected date: 02/22/2024        1. The patient presents today to establish care.  All care has been done outside of our hospital prior to this.  We are going to try to get medical records so that we do not duplicate any test.  She states she is up-to-date on colonoscopy and mammogram.  She has never had Pneumovax.  Once she is over this acute asthma exacerbation we can look at getting her the pneumonia vaccine.  I have also recommended RSV and shingles vaccine.  These can be done through her pharmacy.    2. GERD-could contribute some to this cough that she has.  She is on Protonix 40 mg daily.  Instructions are to take 30 minutes prior to 1st meal every day.    3. Hypothyroidism-she is on 50 mcg of Synthroid.  We are going to try to get a copy of her medical records to see what her last TSH and free T4 were    4. ? eosinophilic asthma-sounds like this diagnosis was made through [facility].  We are going to try to get a copy of her PFTs and medical records.  She has been on Xolair.  She is made 3 trips to the ER in the last 6 months.  Started on Trelegy 2 weeks  ago.  She is also on Singulair.  Currently on a course of prednisone following last night's ER visit.  We are going to refer her to pulmonology here at her request.     Billing for this encounter is based on time spent which is 62 minutes    No follow-ups on file.

## 2024-02-15 NOTE — LETTER
This communication is flagged as high priority.        AUTHORIZATION FOR RELEASE OF   CONFIDENTIAL INFORMATION    Dear [facility],    We are seeing Theresa Fam, date of birth 1945, in the clinic at [facility]. Ho Brady DO is the patient's PCP. Theresa Fam has an outstanding lab/procedure at the time we reviewed her chart. In order to help keep her health information updated, she has authorized us to request the following medical record(s):        (  )  MAMMOGRAM                                      (  )  COLONOSCOPY      (  )  PAP SMEAR                                          (  )  OUTSIDE LAB RESULTS     (  )  DEXA SCAN                                          (  )  EYE EXAM            (  )  FOOT EXAM                                          (  )  ENTIRE RECORD     (  )  OUTSIDE IMMUNIZATIONS                 ( x )  PFT's and recent office visits, labs, etc with pulmonology.         Please fax records to [facility], Swift, Christopher W, DO, [phone]      If you have any questions, please contact Lita Rangel LPN at [phone].           Patient Name: Theresa Fam  : 1945  Patient Phone #: [phone]

## 2024-02-19 DIAGNOSIS — J82.83 EOSINOPHILIC ASTHMA: Primary | ICD-10-CM

## 2024-02-19 RX ORDER — ALBUTEROL SULFATE 0.83 MG/ML
2.5 SOLUTION RESPIRATORY (INHALATION) EVERY 6 HOURS PRN
Qty: 360 ML | Refills: 2 | Status: SHIPPED | OUTPATIENT
Start: 2024-02-19 | End: 2025-02-18

## 2024-02-19 NOTE — TELEPHONE ENCOUNTER
Spoke to pt spouse and he states that before appt with dr jacobs they had went to er. She was given a rx fro albuterol nebulizer solution. He states the pharmacy did not have the one [facility] sent in. I have spoke to [facility] to confirm that they have the 2.5mg/3ml.

## 2024-02-19 NOTE — TELEPHONE ENCOUNTER
----- Message from John Thompson sent at 2/19/2024  9:41 AM CST -----  Patient's  called stating that the patient just left the emergency room and would like to speak to a nurse in the back about some concerns at   [phone]

## 2024-02-20 NOTE — TELEPHONE ENCOUNTER
Spoke to spouse and let him know medication has been sent in. I apologized for the delay in getting back. Spouse thanked me for getting this done.

## 2024-02-22 ENCOUNTER — OFFICE VISIT (OUTPATIENT)
Dept: PULMONOLOGY | Facility: CLINIC | Age: 79
End: 2024-02-22
Payer: MEDICARE

## 2024-02-22 VITALS
OXYGEN SATURATION: 96 % | BODY MASS INDEX: 26.39 KG/M2 | DIASTOLIC BLOOD PRESSURE: 72 MMHG | WEIGHT: 158.38 LBS | SYSTOLIC BLOOD PRESSURE: 126 MMHG | HEIGHT: 65 IN | HEART RATE: 77 BPM | RESPIRATION RATE: 18 BRPM

## 2024-02-22 DIAGNOSIS — J06.9 RECURRENT URI (UPPER RESPIRATORY INFECTION): ICD-10-CM

## 2024-02-22 DIAGNOSIS — R05.3 CHRONIC COUGH: Primary | ICD-10-CM

## 2024-02-22 DIAGNOSIS — J82.83 EOSINOPHILIC ASTHMA: ICD-10-CM

## 2024-02-22 DIAGNOSIS — J45.991 COUGH VARIANT ASTHMA: ICD-10-CM

## 2024-02-22 DIAGNOSIS — R06.02 SHORTNESS OF BREATH: ICD-10-CM

## 2024-02-22 PROCEDURE — 99204 OFFICE O/P NEW MOD 45 MIN: CPT | Mod: S$PBB,,, | Performed by: STUDENT IN AN ORGANIZED HEALTH CARE EDUCATION/TRAINING PROGRAM

## 2024-02-22 PROCEDURE — 99215 OFFICE O/P EST HI 40 MIN: CPT | Mod: PBBFAC | Performed by: STUDENT IN AN ORGANIZED HEALTH CARE EDUCATION/TRAINING PROGRAM

## 2024-02-22 RX ORDER — CIPROFLOXACIN 500 MG/1
500 TABLET ORAL 2 TIMES DAILY
COMMUNITY
End: 2024-03-22 | Stop reason: ALTCHOICE

## 2024-02-22 RX ORDER — BENZONATATE 100 MG/1
100 CAPSULE ORAL 3 TIMES DAILY PRN
COMMUNITY

## 2024-02-22 NOTE — ASSESSMENT & PLAN NOTE
79 yo F with chronic rhinitis and urticaria on Xolair presents with progression in respiratory symptoms characterized by cough now with 3 ED visits since 01/2024. Differential includes cough variant asthma vs hypereosinophilic syndrome vs UACS. Management and work up as follows:  - obtain CBC, region 6 panel and Ig  - obtain PFT  - obtain CT Chest to assess for chronic cough; prior imaging requested  - cont Trelegy Qday and albuterol nebulizer PRN  - opt  for dehumidification over using humidifier

## 2024-02-22 NOTE — PROGRESS NOTES
[facility]  Pulmonology  NEW VISIT     Patient Name:  Theresa Fam  Primary Care Provider: Ho Brady DO  Date of Service: 02/22/2024  Reason for Referral: Eosinophilic Asthma    Chief Complaint: Cough    SUBJECTIVE   HPI:  Theresa Fam is a 78 y.o. female with asthma, hypothyroidism, chronic urticaria on Xolair and GERD who presents today upon referral with complaints of cough. She is accompanied by her .     Sarwat reports worsening of her cough symptoms in the past 3 months. Her symptoms initially consistent of coughing paroxysms with associated shortness of breath. This evolved to having coughing episodes with no sleep interruption. She has a sensation of drainage at the back of her throat. She also has chest congestion. Her coughing episodes now with goal to relieve phlegm with expectorant a small volume green colored sputum. Her symptoms have progressed in severity requiring presentation to the ED 3 times since the start of this year. Outside records have been reviewed in Care Everywhere. She has improvement of symptoms while on steroid therapy as well as antibiotics and antitussives. She has history of CRSsNP. She follows with Allergy medicine where she is treated with Xolair injections since 2022; last Feb 12th 2024. She uses Trelegy daily now and has just received albuterol nebuilizer mediation and equipment. Her home in the past 3 weeks has undergone maintenance of the HVAC system and cleaning of her carpeted rooms.    Past Medical History:   Diagnosis Date    Asthma        Past Surgical History:   Procedure Laterality Date    BRONCHOSCOPY  01/2024    CHOLECYSTECTOMY         Family History   Problem Relation Age of Onset    Breast cancer Maternal Aunt     Breast cancer Maternal Aunt     Breast cancer Maternal Aunt     Breast cancer Maternal Cousin     Breast cancer Maternal Cousin     Breast cancer Maternal Cousin         Social History     Socioeconomic History    Marital status:  "   Tobacco Use    Smoking status: Never     Passive exposure: Never    Smokeless tobacco: Never   Substance and Sexual Activity    Alcohol use: Never    Drug use: Never    Sexual activity: Yes       Social History     Social History Narrative    Not on file       Review of patient's allergies indicates:   Allergen Reactions    Amoxil [amoxicillin]     Rocephin [ceftriaxone]         Medications: Medications reviewed to include over the counter medications.    Review of Systems: A focused ROS was completed and found to be negative except for that mentioned above.      OBJECTIVE   PHYSICAL EXAM:  Vitals:    02/22/24 0802   BP: 126/72   BP Location: Right arm   Patient Position: Sitting   BP Method: Large (Manual)   Pulse: 77   Resp: 18   SpO2: 96%   Weight: 71.8 kg (158 lb 6.4 oz)   Height: 5' 5" (1.651 m)        GENERAL: NAD  HEENT: normocephalic, non-icteric conjunctivae, moist oral mucosa  RESPIRATORY: faint early expiratory wheeze that clears with coughing episode, no rales or rhonchi  CARDIOVASCULAR: Regular rate and rhythm, no murmurs rubs or gallops.  SKIN: no rash, jaundice, ecchymosis or ulcers  MUSCULOSKELETAL: No clubbing or cyanosis; no pedal edema  NEUROLOGIC: AO ×3, no gross deficits  PSYCH: Normal mood and affect    LABS:  No lab studies available for review at time of visit.    IMAGING:  No dedicated lung imaging available for review at time of visit.  CXR 01/2024 OSH:  FINDINGS:The cardiovascular and mediastinal contours unremarkable.     LUNG FUNCTION TESTING:  None available to review or report      ASSESSMENT & PLAN     1. Chronic cough  Assessment & Plan:  77 yo F with chronic rhinitis and urticaria on Xolair presents with progression in respiratory symptoms characterized by cough now with 3 ED visits since 01/2024. Differential includes cough variant asthma vs hypereosinophilic syndrome vs UACS. Management and work up as follows:  - obtain CBC, region 6 panel and Ig  - obtain PFT  - obtain " CT Chest to assess for chronic cough; prior imaging requested  - cont Trelegy Qday and albuterol nebulizer PRN  - opt  for dehumidification over using humidifier    Orders:  -     Complete PFT w/ bronchodilator; Future  -     CT Chest Without Contrast; Future; Expected date: 02/22/2024  -     Resp Profile, [facility]; Future; Expected date: 02/22/2024    2. Eosinophilic asthma  -     Ambulatory referral/consult to Pulmonology  -     CBC Auto Differential; Future; Expected date: 02/22/2024  -     fluticasone-umeclidin-vilanter (TRELEGY ELLIPTA) 100-62.5-25 mcg DsDv; Take 2 puffs by mouth Daily.  Dispense: 60 each; Refill: 11    3. Shortness of breath  -     CBC Auto Differential; Future; Expected date: 02/22/2024  -     Resp Profile, [facility]; Future; Expected date: 02/22/2024    4. Cough variant asthma  -     Resp Profile, [facility]; Future; Expected date: 02/22/2024  -     fluticasone-umeclidin-vilanter (TRELEGY ELLIPTA) 100-62.5-25 mcg DsDv; Take 2 puffs by mouth Daily.  Dispense: 60 each; Refill: 11    5. Recurrent URI (upper respiratory infection)  -     Immunoglobulins (IgG, IgA, IgM) Quantitative; Future; Expected date: 02/22/2024           Follow up in about 4 weeks (around 3/21/2024) for Routine follow up.      Case was discussed with patient; all questions were answered to patient's satisfaction and patient verbalized understanding.     Angela Alex MD  Pulmonary Medicine  [facility]  Phone: [phone]

## 2024-02-27 ENCOUNTER — TELEPHONE (OUTPATIENT)
Dept: PULMONOLOGY | Facility: CLINIC | Age: 79
End: 2024-02-27
Payer: MEDICARE

## 2024-02-29 ENCOUNTER — TELEPHONE (OUTPATIENT)
Dept: PULMONOLOGY | Facility: CLINIC | Age: 79
End: 2024-02-29
Payer: MEDICARE

## 2024-02-29 NOTE — TELEPHONE ENCOUNTER
Patient picked up samples and brought in her date for last shot of Xolair 2/12/2024, will scan into chart.     No answer when I called patient left voicemail, will have 2 samples Trelegy 100 for patient and asked her to confirm her last Xolair shot.          Per Dr. Alex: I agree with the samples particularly given her recent exacerbations. Let's give her two samples and keep her follow up next month. When you call her, could we please confirm once again when her last Xolair dose was administered. Thank you       We sent in script for Trelegy 100 it was too expensive, per patient Dr. Baumann sent in script for Spriva it  was just as expensive.   Is there something else we can call in that would not be as expensive or would you like to give her a sample? Patient did ask to [facility]. Updated chart to reflect that.   Sent message to Dr. Alex

## 2024-03-08 ENCOUNTER — TELEPHONE (OUTPATIENT)
Dept: PULMONOLOGY | Facility: CLINIC | Age: 79
End: 2024-03-08
Payer: MEDICARE

## 2024-03-08 NOTE — TELEPHONE ENCOUNTER
Per Dr. Alex she needs to contact her PCP, wrote patient back on mychart and advised her of such. 3/11/2024    Mrs. Fam called this morning stating her cold and cough stiffness is coming back. She had stopped the prednisone, tessalon pearls and Ed a hist but is wondering now if she should start them back or if we need to call something different in or if she needed to come in for follow up. Please review and advise. She does have a few of each one of those medications left.   Sent message to Dr. Alex 3/8/2024

## 2024-03-09 DIAGNOSIS — Z71.89 COMPLEX CARE COORDINATION: ICD-10-CM

## 2024-03-11 ENCOUNTER — PATIENT MESSAGE (OUTPATIENT)
Dept: PULMONOLOGY | Facility: CLINIC | Age: 79
End: 2024-03-11
Payer: MEDICARE

## 2024-03-11 ENCOUNTER — TELEPHONE (OUTPATIENT)
Dept: INTERNAL MEDICINE | Facility: CLINIC | Age: 79
End: 2024-03-11
Payer: MEDICARE

## 2024-03-11 ENCOUNTER — OFFICE VISIT (OUTPATIENT)
Dept: PRIMARY CARE CLINIC | Facility: CLINIC | Age: 79
End: 2024-03-11
Payer: MEDICARE

## 2024-03-11 VITALS
DIASTOLIC BLOOD PRESSURE: 60 MMHG | HEART RATE: 88 BPM | OXYGEN SATURATION: 96 % | WEIGHT: 161 LBS | HEIGHT: 65 IN | SYSTOLIC BLOOD PRESSURE: 132 MMHG | TEMPERATURE: 98 F | RESPIRATION RATE: 18 BRPM | BODY MASS INDEX: 26.82 KG/M2

## 2024-03-11 DIAGNOSIS — J82.83 EOSINOPHILIC ASTHMA: Primary | ICD-10-CM

## 2024-03-11 PROCEDURE — 99214 OFFICE O/P EST MOD 30 MIN: CPT | Mod: ,,, | Performed by: NURSE PRACTITIONER

## 2024-03-11 NOTE — TELEPHONE ENCOUNTER
Return call to patient who said her cough had returned. I informed her that unfortunately  was doing hospital rounds this week and had nothing available this week. I did recommend her to be see at [facility] on the first floor. She stated she would consider if the provider she was seeing before  could not see her. I told her that would be fine and if she needed any more assistance to give a call back.

## 2024-03-11 NOTE — TELEPHONE ENCOUNTER
----- Message from Roxanne Sawant sent at 3/11/2024  9:19 AM CDT -----  Patient called stating she need appointment tomorrow or in the morning. She still have a cough from being in the hospital.

## 2024-03-11 NOTE — PROGRESS NOTES
Pt is a 77 y/o WF here as walk in for SOB, chest tightness. Hx eosinophilic asthma and chronic cough. On Trelegy and albuterol rescue inhaler. Also on prednisone 20 mg daily. Has CT chest scheduled in 2 days. Sees Dr. Alex with pulmonology on 3/22/24. On Xolair injections for chronic hives.    Past Medical History:   Diagnosis Date    Asthma      Patient Active Problem List   Diagnosis    Gastroesophageal reflux disease    Hypothyroidism    Eosinophilic asthma    Chronic cough     Review of Systems   Constitutional:  Negative for chills and fever.   Respiratory:  Positive for cough. Negative for shortness of breath.    Cardiovascular:  Negative for chest pain.   Gastrointestinal:  Negative for abdominal pain, blood in stool, constipation, diarrhea, melena, nausea and vomiting.   Skin:  Negative for rash.   Neurological:  Negative for weakness.     Physical Exam  Vitals reviewed. Exam conducted with a chaperone present.   Constitutional:       General: She is not in acute distress.     Appearance: Normal appearance.   HENT:      Head: Normocephalic.      Mouth/Throat:      Mouth: Mucous membranes are moist.      Pharynx: Oropharynx is clear.   Eyes:      General: No scleral icterus.     Pupils: Pupils are equal, round, and reactive to light.   Cardiovascular:      Rate and Rhythm: Normal rate.   Pulmonary:      Effort: Pulmonary effort is normal. No respiratory distress.      Breath sounds: Normal breath sounds.   Abdominal:      General: There is no distension.      Palpations: Abdomen is soft.      Tenderness: There is no abdominal tenderness. There is no guarding or rebound.   Skin:     General: Skin is warm and dry.   Neurological:      General: No focal deficit present.      Mental Status: She is alert and oriented to person, place, and time. Mental status is at baseline.   Psychiatric:         Mood and Affect: Mood normal.         Behavior: Behavior normal.         Thought Content: Thought content normal.          Judgment: Judgment normal.       Plan  Eosinophilic asthma    Breathing tx TID  Continue Prednisone 20 mg daily  May also continue Ed-A-Judy Rivas PRN  CT chest Wednesday    Follow up if symptoms worsen or fail to improve.

## 2024-03-11 NOTE — TELEPHONE ENCOUNTER
----- Message from Franny Paiz sent at 3/11/2024  8:41 AM CDT -----  Patient would like someone to give her a call back about an appointment     Patient call back [phone]

## 2024-03-13 ENCOUNTER — HOSPITAL ENCOUNTER (OUTPATIENT)
Dept: RADIOLOGY | Facility: HOSPITAL | Age: 79
Discharge: HOME | End: 2024-03-13
Attending: STUDENT IN AN ORGANIZED HEALTH CARE EDUCATION/TRAINING PROGRAM
Payer: MEDICARE

## 2024-03-13 DIAGNOSIS — R05.3 CHRONIC COUGH: ICD-10-CM

## 2024-03-13 PROCEDURE — 71250 CT THORAX DX C-: CPT | Mod: TC

## 2024-03-13 PROCEDURE — 71250 CT THORAX DX C-: CPT | Mod: 26,,, | Performed by: STUDENT IN AN ORGANIZED HEALTH CARE EDUCATION/TRAINING PROGRAM

## 2024-03-21 ENCOUNTER — OFFICE VISIT (OUTPATIENT)
Dept: PULMONOLOGY | Facility: CLINIC | Age: 79
End: 2024-03-21
Payer: MEDICARE

## 2024-03-21 ENCOUNTER — CLINICAL SUPPORT (OUTPATIENT)
Dept: PULMONOLOGY | Facility: HOSPITAL | Age: 79
End: 2024-03-21
Attending: STUDENT IN AN ORGANIZED HEALTH CARE EDUCATION/TRAINING PROGRAM
Payer: MEDICARE

## 2024-03-21 VITALS
OXYGEN SATURATION: 94 % | HEART RATE: 93 BPM | RESPIRATION RATE: 18 BRPM | HEIGHT: 65 IN | SYSTOLIC BLOOD PRESSURE: 108 MMHG | DIASTOLIC BLOOD PRESSURE: 60 MMHG | OXYGEN SATURATION: 96 % | WEIGHT: 155 LBS | BODY MASS INDEX: 25.83 KG/M2

## 2024-03-21 DIAGNOSIS — R05.3 CHRONIC COUGH: ICD-10-CM

## 2024-03-21 DIAGNOSIS — J30.2 SEASONAL ALLERGIC RHINITIS DUE TO FUNGAL SPORES: ICD-10-CM

## 2024-03-21 DIAGNOSIS — J45.51 SEVERE PERSISTENT ASTHMA WITH ACUTE EXACERBATION: Primary | ICD-10-CM

## 2024-03-21 DIAGNOSIS — J82.83 EOSINOPHILIC ASTHMA: ICD-10-CM

## 2024-03-21 PROCEDURE — 27100098 HC SPACER

## 2024-03-21 PROCEDURE — 99214 OFFICE O/P EST MOD 30 MIN: CPT | Mod: S$PBB,25,, | Performed by: STUDENT IN AN ORGANIZED HEALTH CARE EDUCATION/TRAINING PROGRAM

## 2024-03-21 PROCEDURE — 94060 EVALUATION OF WHEEZING: CPT

## 2024-03-21 PROCEDURE — 94729 DIFFUSING CAPACITY: CPT

## 2024-03-21 PROCEDURE — 94726 PLETHYSMOGRAPHY LUNG VOLUMES: CPT

## 2024-03-21 PROCEDURE — 99214 OFFICE O/P EST MOD 30 MIN: CPT | Mod: PBBFAC,25 | Performed by: STUDENT IN AN ORGANIZED HEALTH CARE EDUCATION/TRAINING PROGRAM

## 2024-03-21 RX ORDER — FLUTICASONE PROPIONATE 50 MCG
1 SPRAY, SUSPENSION (ML) NASAL DAILY
Qty: 18.2 ML | Refills: 11 | Status: SHIPPED | OUTPATIENT
Start: 2024-03-21

## 2024-03-21 RX ORDER — CETIRIZINE HYDROCHLORIDE 10 MG/1
10 TABLET ORAL DAILY
Qty: 30 TABLET | Refills: 3 | Status: SHIPPED | OUTPATIENT
Start: 2024-03-21 | End: 2024-04-18 | Stop reason: SDUPTHER

## 2024-03-21 RX ORDER — PREDNISONE 50 MG/1
50 TABLET ORAL DAILY
Qty: 7 TABLET | Refills: 0 | Status: SHIPPED | OUTPATIENT
Start: 2024-03-21 | End: 2024-04-18

## 2024-03-21 NOTE — PROGRESS NOTES
[facility]  Pulmonology  ESTABLISHED VISIT     Patient Name:  Theresa Fam  Primary Care Provider: Ho Brady DO  Date of Service: 03/21/2024    Chief Complaint: Cough    SUBJECTIVE   HPI:  Theresa Fam is a 78 y.o. female with eosinophilic asthma, hypothyroidism, chronic urticaria on Xolair and GERD who presents for follow up of cough. Last seen 02/2024 with plan for CT Chest and PFT. She is accompanied by her .     Sarwat reports having an acute episode overnight with coughing paroxysms and breathlessness. She had to use her nebulizer twice. Her symptoms were worrisome enough for her  to consider bringing her to the ED. She had improvement with breathing treatments. She remains short of breath today.   Review of OSH records notable for her undergoing bronchoscopy 01/05/2024 with culture growing Haemophilus parainfluenza.     Initial HPI  Sarwat reports worsening of her cough symptoms in the past 3 months. Her symptoms initially consistent of coughing paroxysms with associated shortness of breath. This evolved to having coughing episodes with no sleep interruption. She has a sensation of drainage at the back of her throat. She also has chest congestion. Her coughing episodes now with goal to relieve phlegm with expectorant a small volume green colored sputum. Her symptoms have progressed in severity requiring presentation to the ED 3 times since the start of this year. Outside records have been reviewed in Care Everywhere. She has improvement of symptoms while on steroid therapy as well as antibiotics and antitussives. She has history of CRSsNP. She follows with Allergy medicine where she is treated with Xolair injections since 2022; last Feb 12th 2024. She uses Trelegy daily now and has just received albuterol nebuilizer mediation and equipment. Her home in the past 3 weeks has undergone maintenance of the HVAC system and cleaning of her carpeted rooms.    Past Medical History:  "  Diagnosis Date    Asthma        Past Surgical History:   Procedure Laterality Date    BRONCHOSCOPY  01/2024    CHOLECYSTECTOMY         Family History   Problem Relation Age of Onset    Breast cancer Maternal Aunt     Breast cancer Maternal Aunt     Breast cancer Maternal Aunt     Breast cancer Maternal Cousin     Breast cancer Maternal Cousin     Breast cancer Maternal Cousin         Social History     Socioeconomic History    Marital status:    Tobacco Use    Smoking status: Never     Passive exposure: Never    Smokeless tobacco: Never   Substance and Sexual Activity    Alcohol use: Never    Drug use: Never    Sexual activity: Yes     Partners: Male       Social History     Social History Narrative    Not on file       Review of patient's allergies indicates:   Allergen Reactions    Amoxil [amoxicillin]     Rocephin [ceftriaxone]         Medications: Medications reviewed to include over the counter medications.    Review of Systems: A focused ROS was completed and found to be negative except for that mentioned above.      OBJECTIVE   PHYSICAL EXAM:  Vitals:    03/21/24 1128   BP: 108/60   BP Location: Left arm   Patient Position: Sitting   BP Method: Medium (Manual)   Pulse: 93   Resp: 18   SpO2: (!) 94%   Weight: 70.3 kg (155 lb)   Height: 5' 5" (1.651 m)        GENERAL: NAD  HEENT: normocephalic, non-icteric conjunctivae, moist oral mucosa  RESPIRATORY: bilateral expiratory wheeze, no rales or rhonchi, recurrent coughing episode during encounter  CARDIOVASCULAR: Regular rate and rhythm, no murmurs rubs or gallops.  SKIN: no rash, jaundice, ecchymosis or ulcers  MUSCULOSKELETAL: No clubbing or cyanosis; no pedal edema  NEUROLOGIC: AO ×3, no gross deficits    LABS:  Lab studies reviewed and notable for IgE 432, SEos 580, Ig Quant panel (IgG,A,M) wnl  OS CBC 02/2024:  H/H 14.3/44.2, WBC 7.56, serum eosinophils 1000 (13.4%); , , T bili 0.40, , creatinine 1.10, BNP " 14.3    IMAGING:  Imaging reviewed and notable for CT Chest 03/2024 with Lingular scarring over fissure with mild cylindical bronchiectasis, no masses,  no emphysema, no pleural effusion       CXR 01/2024 OSH:  FINDINGS:The cardiovascular and mediastinal contours unremarkable.     OSH CXR 02/2024: Heart size is within normal limits.  Chronic change of lung with a focal consolidation, pleural effusion or pneumothorax.  Visualized osseous with surrounding soft tissue structures appear grossly unchanged.    LUNG FUNCTION TESTING:    SPIROMETRY/PFT:  03/2024 Pre Post   FVC 1.84/-1.86 2.27/-0.94   FEV1 1.01/-2.84 1.46/-1.69   FEV1/FVC 55/-2.44 64/-1.52   TLC 3.50/-2.59    FRC  2.05/-1.47    RV 1.34/-2.17    DLCO 13.11/-2.31    NOTE: PFT while in acute exacerbation      ASSESSMENT & PLAN     1. Severe persistent asthma with acute exacerbation  -     predniSONE (DELTASONE) 50 MG Tab; Take 1 tablet (50 mg total) by mouth once daily. for 7 days  Dispense: 7 tablet; Refill: 0  -     montelukast (SINGULAIR) 10 mg tablet; Take 1 tablet (10 mg total) by mouth every evening.  Dispense: 30 tablet; Refill: 0    2. Seasonal allergic rhinitis due to fungal spores  -     cetirizine (ZYRTEC) 10 MG tablet; Take 1 tablet (10 mg total) by mouth once daily.  Dispense: 30 tablet; Refill: 3  -     fluticasone propionate (FLONASE) 50 mcg/actuation nasal spray; 1 spray (50 mcg total) by Each Nostril route once daily.  Dispense: 18.2 mL; Refill: 11    3. Eosinophilic asthma  Assessment & Plan:  79 yo F with chronic rhinitis and urticaria on Xolair presents with progression in respiratory symptoms characterized by cough a/w significant dsypnea now with 3 ED visits since 01/2024. Work up has included lab work notable for IgE 432, SEos 580 with last Xolair dose 02/2024. She had a bronch with BAL at OSH 01/2024 with Cx growing Haemophilus parainfluenza. She presents today in acute exacerbation having performed PFT with impressive BD response  despite overnight KVNG use. CT Chest 03/2024 with chronic appearing lingula scar with no other overt evidence of airway or parenchymal disease. Constellation of symptoms is consistent with eosinophilic asthma.   - start steroid burst with Prednisone QDay  - cont Trelegy Qday with KVNG PRN   -- samples given  - start montelukast Qday  - start flonase Qday and cetirizine QDay  - will discuss with Allergy team, Dr. Baumann for consideration of transition of therapy; initially on Xolair for urticaria  - stopped humidifier use; educated that if opt to use, sterile/boiled water as source               Follow up in about 4 weeks (around 4/18/2024).      Case was discussed with patient; all questions were answered to patient's satisfaction and patient verbalized understanding.     Angela Alex MD  Pulmonary Medicine  [facility]  Phone: [phone]

## 2024-03-22 PROCEDURE — 94726 PLETHYSMOGRAPHY LUNG VOLUMES: CPT | Mod: 26,,, | Performed by: STUDENT IN AN ORGANIZED HEALTH CARE EDUCATION/TRAINING PROGRAM

## 2024-03-22 PROCEDURE — 94060 EVALUATION OF WHEEZING: CPT | Mod: 26,,, | Performed by: STUDENT IN AN ORGANIZED HEALTH CARE EDUCATION/TRAINING PROGRAM

## 2024-03-22 PROCEDURE — 94729 DIFFUSING CAPACITY: CPT | Mod: 26,,, | Performed by: STUDENT IN AN ORGANIZED HEALTH CARE EDUCATION/TRAINING PROGRAM

## 2024-03-22 RX ORDER — MONTELUKAST SODIUM 10 MG/1
10 TABLET ORAL NIGHTLY
Qty: 30 TABLET | Refills: 0 | Status: SHIPPED | OUTPATIENT
Start: 2024-03-22 | End: 2024-04-18 | Stop reason: SDUPTHER

## 2024-03-22 NOTE — ASSESSMENT & PLAN NOTE
77 yo F with chronic rhinitis and urticaria on Xolair presents with progression in respiratory symptoms characterized by cough a/w significant dsypnea now with 3 ED visits since 01/2024. Work up has included lab work notable for IgE 432, SEos 580 with last Xolair dose 02/2024. She had a bronch with BAL at OSH 01/2024 with Cx growing Haemophilus parainfluenza. She presents today in acute exacerbation despite Prednisone 20 Qday having performed PFT with impressive BD response despite overnight KVNG use. CT Chest 03/2024 with chronic appearing lingula scar with no other overt evidence of airway or parenchymal disease. Constellation of symptoms is consistent with eosinophilic asthma.   - start steroid burst with Prednisone QDay  - cont Trelegy Qday with KVNG PRN   -- samples given  - start montelukast Qday  - start flonase Qday and cetirizine QDay  - will discuss with Allergy team, Dr. Baumann for consideration of transition of therapy; initially on Xolair for urticaria  - stopped humidifier use; educated that if opt to use, sterile/boiled water as source

## 2024-03-22 NOTE — PROCEDURES
PFT REPORT:  SPIROMETRY:  The pre-BD FVC is decreased, 1.84L/-1.86 Z score.  The pre-BD FEV1 is decreased, 1.01L/-2.84 Z score.  Thre pre-BD FEV1/FVC ratio is 55/-2.44 Z score.    The post-BD FVC is decreased, 2.27L/-0.94 Z score.  The post-BD FEV1 is decreased, 1.46L/-1.69 Z score.  The post-BD FEV1/FVC ratio is 64/-1.52 Z score.    The is a significant bronchodilator effect.  The flow volume loop is normal.    LUNG VOLUMES:  The TLC is decreased, 3.50L/-2.59 Z score.  The FRC is normal, 2.05L/-1.47 Z score.  The RV is decreased, 1.34L/-2.17 Z score.    DIFFUSION CAPACITY:  The diffusion capacity is corrected for hemoglobin and is decreased, 13.11/-2.31 Z score.    Interpretation:  The post-BD FEV1/FVC ratio is low based on GOLD guidelines for COPD and normal based on the lower limit of normal (LLN) cutoff. In a patient with risk factors for COPD, would consider COPD. If COPD is not suspected, ratio is likely within normal range without obstruction. If COPD is suspected, the post-BD spirometry shoes a moderate obstructive defect.    There is significant and robust response to bronchodilators.   The flow volume loop is normal post bronchodilator.   There is a moderate restrictive defect.  There is a mild diffusion defect, however, not acceptable for Vi 75% VC    Recommend repeating PFTs; patient is known to physician interpreting PFT and in acute exacerbation at time of testing.    Angela Alex MD  Pulmonary Medicine  [facility]

## 2024-03-28 ENCOUNTER — TELEPHONE (OUTPATIENT)
Dept: PULMONOLOGY | Facility: CLINIC | Age: 79
End: 2024-03-28
Payer: MEDICARE

## 2024-04-08 RX ORDER — PANTOPRAZOLE SODIUM 40 MG/1
40 TABLET, DELAYED RELEASE ORAL DAILY
Qty: 90 TABLET | Refills: 3 | Status: SHIPPED | OUTPATIENT
Start: 2024-04-08

## 2024-04-08 NOTE — TELEPHONE ENCOUNTER
----- Message from Roxanne Sawant sent at 4/8/2024 10:30 AM CDT -----  Need refill on PROTONIX 90 day supply

## 2024-04-18 ENCOUNTER — OFFICE VISIT (OUTPATIENT)
Dept: PULMONOLOGY | Facility: CLINIC | Age: 79
End: 2024-04-18
Payer: MEDICARE

## 2024-04-18 VITALS
SYSTOLIC BLOOD PRESSURE: 134 MMHG | DIASTOLIC BLOOD PRESSURE: 78 MMHG | RESPIRATION RATE: 16 BRPM | HEART RATE: 63 BPM | HEIGHT: 65 IN | WEIGHT: 155 LBS | OXYGEN SATURATION: 95 % | BODY MASS INDEX: 25.83 KG/M2

## 2024-04-18 DIAGNOSIS — J30.2 SEASONAL ALLERGIC RHINITIS DUE TO FUNGAL SPORES: ICD-10-CM

## 2024-04-18 DIAGNOSIS — J82.83 EOSINOPHILIC ASTHMA: Primary | ICD-10-CM

## 2024-04-18 DIAGNOSIS — J45.50 SEVERE PERSISTENT ASTHMA WITHOUT COMPLICATION: ICD-10-CM

## 2024-04-18 PROCEDURE — 99214 OFFICE O/P EST MOD 30 MIN: CPT | Mod: PBBFAC | Performed by: STUDENT IN AN ORGANIZED HEALTH CARE EDUCATION/TRAINING PROGRAM

## 2024-04-18 PROCEDURE — 99214 OFFICE O/P EST MOD 30 MIN: CPT | Mod: S$PBB,,, | Performed by: STUDENT IN AN ORGANIZED HEALTH CARE EDUCATION/TRAINING PROGRAM

## 2024-04-18 RX ORDER — ALBUTEROL SULFATE 90 UG/1
2 AEROSOL, METERED RESPIRATORY (INHALATION)
Qty: 18 G | Refills: 11 | Status: SHIPPED | OUTPATIENT
Start: 2024-04-18 | End: 2025-04-18

## 2024-04-18 RX ORDER — CETIRIZINE HYDROCHLORIDE 10 MG/1
10 TABLET ORAL DAILY
Qty: 30 TABLET | Refills: 11 | Status: SHIPPED | OUTPATIENT
Start: 2024-04-18 | End: 2024-05-15 | Stop reason: SDUPTHER

## 2024-04-18 RX ORDER — MONTELUKAST SODIUM 10 MG/1
10 TABLET ORAL NIGHTLY
Qty: 30 TABLET | Refills: 11 | Status: SHIPPED | OUTPATIENT
Start: 2024-04-18 | End: 2025-04-13

## 2024-04-18 NOTE — PROGRESS NOTES
[facility]  Pulmonology  ESTABLISHED VISIT     Patient Name:  Theresa Fam  Primary Care Provider: Ho Brady DO  Date of Service: 04/18/2024    Chief Complaint: Cough    SUBJECTIVE   HPI:  Theresa Fam is a 78 y.o. female with eosinophilic asthma, hypothyroidism, chronic urticaria on Xolair and GERD who presents for follow up of cough. Last seen 03/2024 with plan for transition of biologice therapy following discussion with Dr. Baumann (Now on Tezpire). She is accompanied by her .     Sarwat reports improvement in her breathing since last month. She has completed her steroid burst for asthma exacerbation (last exacerbation was 03/08/2024). She was started on Tezspire. She has had no ED or urgent care presentations since last seen.     Initial HPI  Sarwat reports worsening of her cough symptoms in the past 3 months. Her symptoms initially consistent of coughing paroxysms with associated shortness of breath. This evolved to having coughing episodes with no sleep interruption. She has a sensation of drainage at the back of her throat. She also has chest congestion. Her coughing episodes now with goal to relieve phlegm with expectorant a small volume green colored sputum. Her symptoms have progressed in severity requiring presentation to the ED 3 times since the start of this year. Outside records have been reviewed in Care Everywhere. She has improvement of symptoms while on steroid therapy as well as antibiotics and antitussives. She has history of CRSsNP. She follows with Allergy medicine where she is treated with Xolair injections since 2022; last Feb 12th 2024. She uses Trelegy daily now and has just received albuterol nebuilizer mediation and equipment. Her home in the past 3 weeks has undergone maintenance of the HVAC system and cleaning of her carpeted rooms.  03/2024: reports having an acute episode overnight with coughing paroxysms and breathlessness. She had to use her nebulizer twice.  "Her symptoms were worrisome enough for her  to consider bringing her to the ED. She had improvement with breathing treatments. She remains short of breath today.   Review of OSH records notable for her undergoing bronchoscopy 01/05/2024 with culture growing Haemophilus parainfluenza.     Past Medical History:   Diagnosis Date    Asthma        Past Surgical History:   Procedure Laterality Date    BRONCHOSCOPY  01/2024    CHOLECYSTECTOMY         Family History   Problem Relation Name Age of Onset    Breast cancer Maternal Aunt      Breast cancer Maternal Aunt      Breast cancer Maternal Aunt      Breast cancer Maternal Cousin      Breast cancer Maternal Cousin      Breast cancer Maternal Cousin          Social History     Socioeconomic History    Marital status:    Tobacco Use    Smoking status: Never     Passive exposure: Never    Smokeless tobacco: Never   Substance and Sexual Activity    Alcohol use: Never    Drug use: Never    Sexual activity: Yes     Partners: Male       Social History     Social History Narrative    Not on file       Review of patient's allergies indicates:   Allergen Reactions    Amoxil [amoxicillin]     Rocephin [ceftriaxone]         Medications: Medications reviewed to include over the counter medications.    Review of Systems: A focused ROS was completed and found to be negative except for that mentioned above.      OBJECTIVE   PHYSICAL EXAM:  Vitals:    04/18/24 1015   BP: 134/78   BP Location: Left arm   Patient Position: Sitting   BP Method: Large (Manual)   Pulse: 63   Resp: 16   SpO2: 95%   Weight: 70.3 kg (155 lb)   Height: 5' 5" (1.651 m)        GENERAL: NAD  HEENT: normocephalic, non-icteric conjunctivae, moist oral mucosa  RESPIRATORY: bilateral expiratory wheeze, no rales or rhonchi, recurrent coughing episode during encounter  CARDIOVASCULAR: Regular rate and rhythm, no murmurs rubs or gallops.  SKIN: no rash, jaundice, ecchymosis or ulcers  MUSCULOSKELETAL: No " clubbing or cyanosis; no pedal edema  NEUROLOGIC: AO ×3, no gross deficits    LABS:  Lab studies reviewed and notable for IgE 432, SEos 580, Ig Quant panel (IgG,A,M) wnl  OSH CBC 02/2024:  H/H 14.3/44.2, WBC 7.56, serum eosinophils 1000 (13.4%); , , T bili 0.40, , creatinine 1.10, BNP 14.3    IMAGING:  Imaging reviewed and notable for CT Chest 03/2024 with Lingular scarring over fissure with mild cylindical bronchiectasis, no masses,  no emphysema, no pleural effusion       CXR 01/2024 OSH:  FINDINGS:The cardiovascular and mediastinal contours unremarkable.     OSH CXR 02/2024: Heart size is within normal limits.  Chronic change of lung with a focal consolidation, pleural effusion or pneumothorax.  Visualized osseous with surrounding soft tissue structures appear grossly unchanged.    LUNG FUNCTION TESTING:    SPIROMETRY/PFT:  03/2024 Pre Post   FVC 1.84/-1.86 2.27/-0.94   FEV1 1.01/-2.84 1.46/-1.69   FEV1/FVC 55/-2.44 64/-1.52   TLC 3.50/-2.59    FRC  2.05/-1.47    RV 1.34/-2.17    DLCO 13.11/-2.31    NOTE: PFT while in acute exacerbation      ASSESSMENT & PLAN     1. Eosinophilic asthma  Assessment & Plan:  79 yo F with chronic rhinitis and urticaria on Xolair presents with progression in respiratory symptoms characterized by cough a/w significant dsypnea now with 3 ED visits since 01/2024. Work up has included lab work notable for IgE 432, SEos 580 with last Xolair dose 02/2024. She had a bronch with BAL at OSH 01/2024 with Cx growing Haemophilus parainfluenza. She presented 03/2024 in acute exacerbation despite Prednisone 20 Qday having performed PFT with impressive BD response despite overnight KVNG use. CT Chest 03/2024 with chronic appearing lingula scar with no other overt evidence of airway or parenchymal disease. Constellation of symptoms is consistent with eosinophilic asthma. She has since been transitioned to Tezspire with interval improvement in symptoms off steroid therapy.  -  cont Trelegy Qday with KVNG PRN   -- samples given   -- refill provided  - cont montelukast Qday, refill provided  - cont flonase Qday and cetirizine Qday  - discussed with Allergy team, Dr. Baumann with transition to Tezspire  - stopped humidifier use; educated that if opt to use, sterile/boiled water as source      2. Seasonal allergic rhinitis due to fungal spores  Assessment & Plan:  Refill provided for Cetirizine    Orders:  -     cetirizine (ZYRTEC) 10 MG tablet; Take 1 tablet (10 mg total) by mouth once daily.  Dispense: 30 tablet; Refill: 11    3. Severe persistent asthma without complication  -     montelukast (SINGULAIR) 10 mg tablet; Take 1 tablet (10 mg total) by mouth every evening.  Dispense: 30 tablet; Refill: 11  -     VENTOLIN HFA 90 mcg/actuation inhaler; Inhale 2 puffs into the lungs every 4 to 6 hours as needed for Wheezing or Shortness of Breath.  Dispense: 18 g; Refill: 11        Follow up in about 4 months (around 8/18/2024) for Routine follow up.      Case was discussed with patient; all questions were answered to patient's satisfaction and patient verbalized understanding.     Angela Alex MD  Pulmonary Medicine  [facility]  Phone: [phone]

## 2024-04-18 NOTE — ASSESSMENT & PLAN NOTE
79 yo F with chronic rhinitis and urticaria on Xolair presents with progression in respiratory symptoms characterized by cough a/w significant dsypnea now with 3 ED visits since 01/2024. Work up has included lab work notable for IgE 432, SEos 580 with last Xolair dose 02/2024. She had a bronch with BAL at OSH 01/2024 with Cx growing Haemophilus parainfluenza. She presented 03/2024 in acute exacerbation despite Prednisone 20 Qday having performed PFT with impressive BD response despite overnight KVNG use. CT Chest 03/2024 with chronic appearing lingula scar with no other overt evidence of airway or parenchymal disease. Constellation of symptoms is consistent with eosinophilic asthma. She has since been transitioned to Tezspire with interval improvement in symptoms off steroid therapy.  - cont Trelegy Qday with KVNG PRN   -- samples given   -- refill provided  - cont montelukast Qday, refill provided  - cont flonase Qday and cetirizine Qday  - discussed with Allergy team, Dr. Baumann with transition to Tezspire  - stopped humidifier use; educated that if opt to use, sterile/boiled water as source

## 2024-04-26 ENCOUNTER — TELEPHONE (OUTPATIENT)
Dept: PULMONOLOGY | Facility: CLINIC | Age: 79
End: 2024-04-26
Payer: MEDICARE

## 2024-04-26 ENCOUNTER — PATIENT MESSAGE (OUTPATIENT)
Dept: PULMONOLOGY | Facility: CLINIC | Age: 79
End: 2024-04-26
Payer: MEDICARE

## 2024-04-26 NOTE — TELEPHONE ENCOUNTER
Sent message via [place]    Yes melida we sent the Trelegy medication in back in February 2024, it was sent in with 11 refills. So you should be able to call your pharmacy and request a refill each month until you are out of refills which should be close to a year. It was sent to [facility] on [address]. If you need anything else just let us know. Thank you. (4/26/2024)        ----- Message from John Thompson sent at 4/26/2024  9:17 AM CDT -----  Patient called wanting to speak to a nurse about her sample Trelegy being sent in for her. She said if you have it you don't have to call her you can just have it sent in to the pharmacy, but if you don't you can just give her a call at [phone].

## 2024-05-03 ENCOUNTER — TELEPHONE (OUTPATIENT)
Dept: PULMONOLOGY | Facility: CLINIC | Age: 79
End: 2024-05-03
Payer: MEDICARE

## 2024-05-03 NOTE — TELEPHONE ENCOUNTER
----- Message from John Thompson sent at 5/3/2024  8:16 AM CDT -----  Patient called needing to speak to a nurse about a cough that she can't seem to get over. She also stated that she's dealing with asthma and wheezing. A good phone number is [phone]

## 2024-05-08 ENCOUNTER — TELEPHONE (OUTPATIENT)
Dept: PULMONOLOGY | Facility: CLINIC | Age: 79
End: 2024-05-08
Payer: MEDICARE

## 2024-05-08 NOTE — TELEPHONE ENCOUNTER
I called back but I did not get an answer so, I left a voicemail for  to try giving us a call back.

## 2024-05-08 NOTE — TELEPHONE ENCOUNTER
----- Message from John Thompson sent at 5/8/2024 10:54 AM CDT -----  Patient's  called needing to speak to a nurse about a prescription that was prescribed to her. A good phone number to reach him is [phone].

## 2024-05-15 ENCOUNTER — OFFICE VISIT (OUTPATIENT)
Dept: PULMONOLOGY | Facility: CLINIC | Age: 79
End: 2024-05-15
Payer: MEDICARE

## 2024-05-15 ENCOUNTER — HOSPITAL ENCOUNTER (OUTPATIENT)
Dept: RADIOLOGY | Facility: HOSPITAL | Age: 79
Discharge: HOME | End: 2024-05-15
Attending: STUDENT IN AN ORGANIZED HEALTH CARE EDUCATION/TRAINING PROGRAM
Payer: MEDICARE

## 2024-05-15 ENCOUNTER — TELEPHONE (OUTPATIENT)
Dept: PULMONOLOGY | Facility: CLINIC | Age: 79
End: 2024-05-15
Payer: MEDICARE

## 2024-05-15 VITALS
WEIGHT: 161 LBS | OXYGEN SATURATION: 97 % | BODY MASS INDEX: 26.82 KG/M2 | RESPIRATION RATE: 18 BRPM | SYSTOLIC BLOOD PRESSURE: 142 MMHG | HEIGHT: 65 IN | DIASTOLIC BLOOD PRESSURE: 72 MMHG | HEART RATE: 60 BPM

## 2024-05-15 DIAGNOSIS — J30.2 SEASONAL ALLERGIC RHINITIS DUE TO FUNGAL SPORES: ICD-10-CM

## 2024-05-15 DIAGNOSIS — R05.3 CHRONIC COUGH: ICD-10-CM

## 2024-05-15 DIAGNOSIS — J82.83 EOSINOPHILIC ASTHMA: ICD-10-CM

## 2024-05-15 DIAGNOSIS — R05.3 CHRONIC COUGH: Primary | ICD-10-CM

## 2024-05-15 PROCEDURE — 71046 X-RAY EXAM CHEST 2 VIEWS: CPT | Mod: 26,,, | Performed by: RADIOLOGY

## 2024-05-15 PROCEDURE — 99215 OFFICE O/P EST HI 40 MIN: CPT | Mod: PBBFAC,25 | Performed by: STUDENT IN AN ORGANIZED HEALTH CARE EDUCATION/TRAINING PROGRAM

## 2024-05-15 PROCEDURE — 99214 OFFICE O/P EST MOD 30 MIN: CPT | Mod: S$PBB,,, | Performed by: STUDENT IN AN ORGANIZED HEALTH CARE EDUCATION/TRAINING PROGRAM

## 2024-05-15 PROCEDURE — 71046 X-RAY EXAM CHEST 2 VIEWS: CPT | Mod: TC

## 2024-05-15 RX ORDER — IPRATROPIUM BROMIDE 42 UG/1
1 SPRAY, METERED NASAL 3 TIMES DAILY
COMMUNITY
Start: 2024-05-06

## 2024-05-15 RX ORDER — LEVOFLOXACIN 500 MG/1
500 TABLET, FILM COATED ORAL DAILY
COMMUNITY
Start: 2024-05-04 | End: 2024-06-12

## 2024-05-15 RX ORDER — PREDNISONE 50 MG/1
50 TABLET ORAL DAILY
Qty: 7 TABLET | Refills: 0 | Status: SHIPPED | OUTPATIENT
Start: 2024-05-15 | End: 2024-06-12 | Stop reason: SDUPTHER

## 2024-05-15 RX ORDER — CETIRIZINE HYDROCHLORIDE 10 MG/1
10 TABLET ORAL DAILY
Qty: 30 TABLET | Refills: 11 | Status: SHIPPED | OUTPATIENT
Start: 2024-05-15 | End: 2024-06-12 | Stop reason: SDUPTHER

## 2024-05-15 NOTE — H&P (VIEW-ONLY)
[facility]  Pulmonology  ESTABLISHED VISIT     Patient Name:  Theresa Fam  Primary Care Provider: Ho Brady DO  Date of Service: 05/15/2024    Chief Complaint: Cough    SUBJECTIVE   HPI:  Theresa Fam is a 78 y.o. female with eosinophilic asthma, hypothyroidism, chronic urticaria on Xolair and GERD who presents for follow up of cough. Last seen 03/2024 with plan for transition of biologice therapy following discussion with Dr. Baumann (Now on Tezpire). She is accompanied by her .     Sarwat has had an exacerbation in the past month characterized by increase in her coughing episodes and requiring a course of steroids (including dexamethasone) and antibiotics. She was started on Tezspire and has received her 2nd dose.    Initial HPI  Sarwat reports worsening of her cough symptoms in the past 3 months. Her symptoms initially consistent of coughing paroxysms with associated shortness of breath. This evolved to having coughing episodes with no sleep interruption. She has a sensation of drainage at the back of her throat. She also has chest congestion. Her coughing episodes now with goal to relieve phlegm with expectorant a small volume green colored sputum. Her symptoms have progressed in severity requiring presentation to the ED 3 times since the start of this year. Outside records have been reviewed in Care Everywhere. She has improvement of symptoms while on steroid therapy as well as antibiotics and antitussives. She has history of CRSsNP. She follows with Allergy medicine where she is treated with Xolair injections since 2022; last Feb 12th 2024. She uses Trelegy daily now and has just received albuterol nebuilizer mediation and equipment. Her home in the past 3 weeks has undergone maintenance of the HVAC system and cleaning of her carpeted rooms.  03/2024: reports having an acute episode overnight with coughing paroxysms and breathlessness. She had to use her nebulizer twice. Her symptoms  "were worrisome enough for her  to consider bringing her to the ED. She had improvement with breathing treatments. She remains short of breath today.   Review of OSH records notable for her undergoing bronchoscopy 01/05/2024 with culture growing Haemophilus parainfluenza.     Past Medical History:   Diagnosis Date    Asthma        Past Surgical History:   Procedure Laterality Date    BRONCHOSCOPY  01/2024    CHOLECYSTECTOMY         Family History   Problem Relation Name Age of Onset    Breast cancer Maternal Aunt      Breast cancer Maternal Aunt      Breast cancer Maternal Aunt      Breast cancer Maternal Cousin      Breast cancer Maternal Cousin      Breast cancer Maternal Cousin          Social History     Socioeconomic History    Marital status:    Tobacco Use    Smoking status: Never     Passive exposure: Never    Smokeless tobacco: Never   Substance and Sexual Activity    Alcohol use: Never    Drug use: Never    Sexual activity: Yes     Partners: Male       Social History     Social History Narrative    Not on file       Review of patient's allergies indicates:   Allergen Reactions    Amoxil [amoxicillin]     Rocephin [ceftriaxone]         Medications: Medications reviewed to include over the counter medications.    Review of Systems: A focused ROS was completed and found to be negative except for that mentioned above.      OBJECTIVE   PHYSICAL EXAM:  Vitals:    05/15/24 1103   BP: (!) 142/72   BP Location: Right arm   Patient Position: Sitting   BP Method: Large (Manual)   Pulse: 60   Resp: 18   SpO2: 97%   Weight: 73 kg (161 lb)   Height: 5' 5" (1.651 m)          GENERAL: NAD  HEENT: normocephalic, non-icteric conjunctivae, moist oral mucosa  RESPIRATORY: bilateral expiratory wheeze, no rales or rhonchi, recurrent coughing episode during encounter  CARDIOVASCULAR: Regular rate and rhythm, no murmurs rubs or gallops.  SKIN: no rash, jaundice, ecchymosis or ulcers  MUSCULOSKELETAL: No clubbing or " cyanosis; no pedal edema  NEUROLOGIC: AO ×3, no gross deficits    LABS:  Lab studies reviewed and notable for IgE 432, SEos 580, Ig Quant panel (IgG,A,M) wnl  OSH CBC 02/2024:  H/H 14.3/44.2, WBC 7.56, serum eosinophils 1000 (13.4%); , , T bili 0.40, , creatinine 1.10, BNP 14.3    IMAGING:  Imaging reviewed and notable for CT Chest 03/2024 with Lingular scarring over fissure with mild cylindical bronchiectasis, no masses,  no emphysema, no pleural effusion       CXR 01/2024 OSH:  FINDINGS:The cardiovascular and mediastinal contours unremarkable.     OSH CXR 02/2024: Heart size is within normal limits.  Chronic change of lung with a focal consolidation, pleural effusion or pneumothorax.  Visualized osseous with surrounding soft tissue structures appear grossly unchanged.    LUNG FUNCTION TESTING:    SPIROMETRY/PFT:  03/2024 Pre Post   FVC 1.84/-1.86 2.27/-0.94   FEV1 1.01/-2.84 1.46/-1.69   FEV1/FVC 55/-2.44 64/-1.52   TLC 3.50/-2.59    FRC  2.05/-1.47    RV 1.34/-2.17    DLCO 13.11/-2.31    NOTE: PFT while in acute exacerbation      ASSESSMENT & PLAN     1. Chronic cough  Assessment & Plan:  Recent exacerbations appears to be secondary to a bacterial bronchitis which improved with steroid and antibiotic therapy.   We will obtain a chest x-ray today for consideration of bronchoscopy in this patient who has lingula tram tracking and mild consolidation on CT imaging from 02/2024.    Orders:  -     X-Ray Chest PA And Lateral; Future; Expected date: 05/15/2024    2. Eosinophilic asthma  Assessment & Plan:  77 yo F with chronic rhinitis and urticaria on Xolair presents with progression in respiratory symptoms characterized by cough a/w significant dsypnea now with 3 ED visits since 01/2024. Work up has included lab work notable for IgE 432, SEos 580 with last Xolair dose 02/2024. She had a bronch with BAL at OSH 01/2024 with Cx growing Haemophilus parainfluenza. She presented 03/2024 in acute  exacerbation despite Prednisone 20 Qday having performed PFT with impressive BD response despite overnight KVNG use. CT Chest 03/2024 with chronic appearing lingula scar with no other overt evidence of airway or parenchymal disease. Constellation of symptoms is consistent with eosinophilic asthma. She has since been transitioned to Tezspire with interval improvement in symptoms off steroid therapy.  - cont Trelegy Qday with KVNG PRN  - cont montelukast Qday, refill provided  - cont flonase Qday and cetirizine Qday   -- refill provided  - providing a script of Prednisone for use with worsening of respiratory symptoms to avoid delay in steroid initiation; she was advised to notify Pulmonary team in event she starts therapy  - cont Tezspire, managed with Dr. Baumann at [facility]      Orders:  -     predniSONE (DELTASONE) 50 MG Tab; 1 tablet (50 mg total) by Per NG tube route once daily.  Dispense: 7 tablet; Refill: 0    3. Seasonal allergic rhinitis due to fungal spores  -     cetirizine (ZYRTEC) 10 MG tablet; Take 1 tablet (10 mg total) by mouth once daily.  Dispense: 30 tablet; Refill: 11      CXR reviewed. Will plan for bronchoscopy tentatively 05/17    Follow up in about 4 weeks (around 6/12/2024).       Case was discussed with patient; all questions were answered to patient's satisfaction and patient verbalized understanding.     Angela Alex MD  Pulmonary Medicine  [facility]  Phone: [phone]

## 2024-05-15 NOTE — PROGRESS NOTES
[facility]  Pulmonology  ESTABLISHED VISIT     Patient Name:  Theresa Fam  Primary Care Provider: Ho Brady DO  Date of Service: 05/15/2024    Chief Complaint: Cough    SUBJECTIVE   HPI:  Theresa Fam is a 78 y.o. female with eosinophilic asthma, hypothyroidism, chronic urticaria on Xolair and GERD who presents for follow up of cough. Last seen 03/2024 with plan for transition of biologice therapy following discussion with Dr. Baumann (Now on Tezpire). She is accompanied by her .     Sarwat has had an exacerbation in the past month characterized by increase in her coughing episodes and requiring a course of steroids (including dexamethasone) and antibiotics. She was started on Tezspire and has received her 2nd dose.    Initial HPI  Sarwat reports worsening of her cough symptoms in the past 3 months. Her symptoms initially consistent of coughing paroxysms with associated shortness of breath. This evolved to having coughing episodes with no sleep interruption. She has a sensation of drainage at the back of her throat. She also has chest congestion. Her coughing episodes now with goal to relieve phlegm with expectorant a small volume green colored sputum. Her symptoms have progressed in severity requiring presentation to the ED 3 times since the start of this year. Outside records have been reviewed in Care Everywhere. She has improvement of symptoms while on steroid therapy as well as antibiotics and antitussives. She has history of CRSsNP. She follows with Allergy medicine where she is treated with Xolair injections since 2022; last Feb 12th 2024. She uses Trelegy daily now and has just received albuterol nebuilizer mediation and equipment. Her home in the past 3 weeks has undergone maintenance of the HVAC system and cleaning of her carpeted rooms.  03/2024: reports having an acute episode overnight with coughing paroxysms and breathlessness. She had to use her nebulizer twice. Her symptoms  "were worrisome enough for her  to consider bringing her to the ED. She had improvement with breathing treatments. She remains short of breath today.   Review of OSH records notable for her undergoing bronchoscopy 01/05/2024 with culture growing Haemophilus parainfluenza.     Past Medical History:   Diagnosis Date    Asthma        Past Surgical History:   Procedure Laterality Date    BRONCHOSCOPY  01/2024    CHOLECYSTECTOMY         Family History   Problem Relation Name Age of Onset    Breast cancer Maternal Aunt      Breast cancer Maternal Aunt      Breast cancer Maternal Aunt      Breast cancer Maternal Cousin      Breast cancer Maternal Cousin      Breast cancer Maternal Cousin          Social History     Socioeconomic History    Marital status:    Tobacco Use    Smoking status: Never     Passive exposure: Never    Smokeless tobacco: Never   Substance and Sexual Activity    Alcohol use: Never    Drug use: Never    Sexual activity: Yes     Partners: Male       Social History     Social History Narrative    Not on file       Review of patient's allergies indicates:   Allergen Reactions    Amoxil [amoxicillin]     Rocephin [ceftriaxone]         Medications: Medications reviewed to include over the counter medications.    Review of Systems: A focused ROS was completed and found to be negative except for that mentioned above.      OBJECTIVE   PHYSICAL EXAM:  Vitals:    05/15/24 1103   BP: (!) 142/72   BP Location: Right arm   Patient Position: Sitting   BP Method: Large (Manual)   Pulse: 60   Resp: 18   SpO2: 97%   Weight: 73 kg (161 lb)   Height: 5' 5" (1.651 m)          GENERAL: NAD  HEENT: normocephalic, non-icteric conjunctivae, moist oral mucosa  RESPIRATORY: bilateral expiratory wheeze, no rales or rhonchi, recurrent coughing episode during encounter  CARDIOVASCULAR: Regular rate and rhythm, no murmurs rubs or gallops.  SKIN: no rash, jaundice, ecchymosis or ulcers  MUSCULOSKELETAL: No clubbing or " cyanosis; no pedal edema  NEUROLOGIC: AO ×3, no gross deficits    LABS:  Lab studies reviewed and notable for IgE 432, SEos 580, Ig Quant panel (IgG,A,M) wnl  OSH CBC 02/2024:  H/H 14.3/44.2, WBC 7.56, serum eosinophils 1000 (13.4%); , , T bili 0.40, , creatinine 1.10, BNP 14.3    IMAGING:  Imaging reviewed and notable for CT Chest 03/2024 with Lingular scarring over fissure with mild cylindical bronchiectasis, no masses,  no emphysema, no pleural effusion       CXR 01/2024 OSH:  FINDINGS:The cardiovascular and mediastinal contours unremarkable.     OSH CXR 02/2024: Heart size is within normal limits.  Chronic change of lung with a focal consolidation, pleural effusion or pneumothorax.  Visualized osseous with surrounding soft tissue structures appear grossly unchanged.    LUNG FUNCTION TESTING:    SPIROMETRY/PFT:  03/2024 Pre Post   FVC 1.84/-1.86 2.27/-0.94   FEV1 1.01/-2.84 1.46/-1.69   FEV1/FVC 55/-2.44 64/-1.52   TLC 3.50/-2.59    FRC  2.05/-1.47    RV 1.34/-2.17    DLCO 13.11/-2.31    NOTE: PFT while in acute exacerbation      ASSESSMENT & PLAN     1. Chronic cough  Assessment & Plan:  Recent exacerbations appears to be secondary to a bacterial bronchitis which improved with steroid and antibiotic therapy.   We will obtain a chest x-ray today for consideration of bronchoscopy in this patient who has lingula tram tracking and mild consolidation on CT imaging from 02/2024.    Orders:  -     X-Ray Chest PA And Lateral; Future; Expected date: 05/15/2024    2. Eosinophilic asthma  Assessment & Plan:  79 yo F with chronic rhinitis and urticaria on Xolair presents with progression in respiratory symptoms characterized by cough a/w significant dsypnea now with 3 ED visits since 01/2024. Work up has included lab work notable for IgE 432, SEos 580 with last Xolair dose 02/2024. She had a bronch with BAL at OSH 01/2024 with Cx growing Haemophilus parainfluenza. She presented 03/2024 in acute  exacerbation despite Prednisone 20 Qday having performed PFT with impressive BD response despite overnight KVNG use. CT Chest 03/2024 with chronic appearing lingula scar with no other overt evidence of airway or parenchymal disease. Constellation of symptoms is consistent with eosinophilic asthma. She has since been transitioned to Tezspire with interval improvement in symptoms off steroid therapy.  - cont Trelegy Qday with KVNG PRN  - cont montelukast Qday, refill provided  - cont flonase Qday and cetirizine Qday   -- refill provided  - providing a script of Prednisone for use with worsening of respiratory symptoms to avoid delay in steroid initiation; she was advised to notify Pulmonary team in event she starts therapy  - cont Tezspire, managed with Dr. Baumann at [facility]      Orders:  -     predniSONE (DELTASONE) 50 MG Tab; 1 tablet (50 mg total) by Per NG tube route once daily.  Dispense: 7 tablet; Refill: 0    3. Seasonal allergic rhinitis due to fungal spores  -     cetirizine (ZYRTEC) 10 MG tablet; Take 1 tablet (10 mg total) by mouth once daily.  Dispense: 30 tablet; Refill: 11      CXR reviewed. Will plan for bronchoscopy tentatively 05/17    Follow up in about 4 weeks (around 6/12/2024).       Case was discussed with patient; all questions were answered to patient's satisfaction and patient verbalized understanding.     Angela Alex MD  Pulmonary Medicine  [facility]  Phone: [phone]

## 2024-05-15 NOTE — ASSESSMENT & PLAN NOTE
79 yo F with chronic rhinitis and urticaria on Xolair presents with progression in respiratory symptoms characterized by cough a/w significant dsypnea now with 3 ED visits since 01/2024. Work up has included lab work notable for IgE 432, SEos 580 with last Xolair dose 02/2024. She had a bronch with BAL at OSH 01/2024 with Cx growing Haemophilus parainfluenza. She presented 03/2024 in acute exacerbation despite Prednisone 20 Qday having performed PFT with impressive BD response despite overnight KVNG use. CT Chest 03/2024 with chronic appearing lingula scar with no other overt evidence of airway or parenchymal disease. Constellation of symptoms is consistent with eosinophilic asthma. She has since been transitioned to Tezspire with interval improvement in symptoms off steroid therapy.  - cont Trelegy Qday with KVNG PRN  - cont montelukast Qday, refill provided  - cont flonase Qday and cetirizine Qday   -- refill provided  - providing a script of Prednisone for use with worsening of respiratory symptoms to avoid delay in steroid initiation; she was advised to notify Pulmonary team in event she starts therapy  - cont Tezspire, managed with Dr. Baumann at [facility]

## 2024-05-15 NOTE — TELEPHONE ENCOUNTER
No Answer.  stated will do Bronch Procedure this Friday 5/14/24 at 730am (Arrive at 7am) NPO after midnight. Will try again later.

## 2024-05-15 NOTE — TELEPHONE ENCOUNTER
Did reach Mrs. Fam by phone she is aware of NPO after midnight on Thursday and be there 7 am on Friday morning. (AS)

## 2024-05-15 NOTE — TELEPHONE ENCOUNTER
----- Message from John Thompson sent at 5/15/2024  1:25 PM CDT -----  Patient called saying she had a missed call from someone and would like a call back at  [phone].

## 2024-05-17 ENCOUNTER — HOSPITAL ENCOUNTER (OUTPATIENT)
Dept: GASTROENTEROLOGY | Facility: HOSPITAL | Age: 79
Discharge: HOME | End: 2024-05-17
Attending: STUDENT IN AN ORGANIZED HEALTH CARE EDUCATION/TRAINING PROGRAM
Payer: MEDICARE

## 2024-05-17 VITALS
RESPIRATION RATE: 16 BRPM | DIASTOLIC BLOOD PRESSURE: 51 MMHG | SYSTOLIC BLOOD PRESSURE: 126 MMHG | WEIGHT: 160 LBS | HEART RATE: 57 BPM | OXYGEN SATURATION: 98 % | HEIGHT: 65 IN | BODY MASS INDEX: 26.66 KG/M2 | TEMPERATURE: 98 F

## 2024-05-17 DIAGNOSIS — R05.3 CHRONIC COUGH: ICD-10-CM

## 2024-05-17 DIAGNOSIS — J82.83 EOSINOPHILIC ASTHMA: Primary | ICD-10-CM

## 2024-05-17 LAB
AFB SMEAR (FA): NORMAL
CLARITY FLD: CLEAR
COLOR FLD: COLORLESS
DIRECT PREP: NORMAL
EOSINOPHIL NFR FLD MANUAL: 7 %
GRANULOCYTES NFR FLD MANUAL: 31 % (ref 0–25)
LYMPHOCYTES NFR FLD MANUAL: 29 %
MACROPHAGES NFR FLD MANUAL: 13 %
MONOCYTES NFR FLD MANUAL: 20 %
RBC # FLD MANUAL: <3000 /CUMM
WBC # FLD MANUAL: 74 /CUMM

## 2024-05-17 PROCEDURE — 87102 FUNGUS ISOLATION CULTURE: CPT | Performed by: STUDENT IN AN ORGANIZED HEALTH CARE EDUCATION/TRAINING PROGRAM

## 2024-05-17 PROCEDURE — G0500 MOD SEDAT ENDO SERVICE >5YRS: HCPCS

## 2024-05-17 PROCEDURE — 99152 MOD SED SAME PHYS/QHP 5/>YRS: CPT | Mod: ,,, | Performed by: STUDENT IN AN ORGANIZED HEALTH CARE EDUCATION/TRAINING PROGRAM

## 2024-05-17 PROCEDURE — 89051 BODY FLUID CELL COUNT: CPT | Performed by: STUDENT IN AN ORGANIZED HEALTH CARE EDUCATION/TRAINING PROGRAM

## 2024-05-17 PROCEDURE — 99153 MOD SED SAME PHYS/QHP EA: CPT

## 2024-05-17 PROCEDURE — 87210 SMEAR WET MOUNT SALINE/INK: CPT | Performed by: STUDENT IN AN ORGANIZED HEALTH CARE EDUCATION/TRAINING PROGRAM

## 2024-05-17 PROCEDURE — 25000003 PHARM REV CODE 250: Performed by: STUDENT IN AN ORGANIZED HEALTH CARE EDUCATION/TRAINING PROGRAM

## 2024-05-17 PROCEDURE — 25000242 PHARM REV CODE 250 ALT 637 W/ HCPCS: Performed by: STUDENT IN AN ORGANIZED HEALTH CARE EDUCATION/TRAINING PROGRAM

## 2024-05-17 PROCEDURE — 31645 BRNCHSC W/THER ASPIR 1ST: CPT | Mod: ,,, | Performed by: STUDENT IN AN ORGANIZED HEALTH CARE EDUCATION/TRAINING PROGRAM

## 2024-05-17 PROCEDURE — 31624 DX BRONCHOSCOPE/LAVAGE: CPT | Mod: 51,,, | Performed by: STUDENT IN AN ORGANIZED HEALTH CARE EDUCATION/TRAINING PROGRAM

## 2024-05-17 PROCEDURE — 87015 SPECIMEN INFECT AGNT CONCNTJ: CPT | Performed by: STUDENT IN AN ORGANIZED HEALTH CARE EDUCATION/TRAINING PROGRAM

## 2024-05-17 PROCEDURE — 87206 SMEAR FLUORESCENT/ACID STAI: CPT | Performed by: STUDENT IN AN ORGANIZED HEALTH CARE EDUCATION/TRAINING PROGRAM

## 2024-05-17 PROCEDURE — 87070 CULTURE OTHR SPECIMN AEROBIC: CPT | Performed by: STUDENT IN AN ORGANIZED HEALTH CARE EDUCATION/TRAINING PROGRAM

## 2024-05-17 PROCEDURE — 25000003 PHARM REV CODE 250

## 2024-05-17 PROCEDURE — 63600175 PHARM REV CODE 636 W HCPCS: Performed by: STUDENT IN AN ORGANIZED HEALTH CARE EDUCATION/TRAINING PROGRAM

## 2024-05-17 PROCEDURE — 31645 BRNCHSC W/THER ASPIR 1ST: CPT | Performed by: STUDENT IN AN ORGANIZED HEALTH CARE EDUCATION/TRAINING PROGRAM

## 2024-05-17 PROCEDURE — 89050 BODY FLUID CELL COUNT: CPT | Mod: 59 | Performed by: STUDENT IN AN ORGANIZED HEALTH CARE EDUCATION/TRAINING PROGRAM

## 2024-05-17 PROCEDURE — 31624 DX BRONCHOSCOPE/LAVAGE: CPT | Performed by: STUDENT IN AN ORGANIZED HEALTH CARE EDUCATION/TRAINING PROGRAM

## 2024-05-17 RX ORDER — ALBUTEROL SULFATE 0.83 MG/ML
2.5 SOLUTION RESPIRATORY (INHALATION) ONCE
Status: COMPLETED | OUTPATIENT
Start: 2024-05-17 | End: 2024-05-17

## 2024-05-17 RX ORDER — LIDOCAINE HYDROCHLORIDE 20 MG/ML
JELLY TOPICAL ONCE
Status: COMPLETED | OUTPATIENT
Start: 2024-05-17 | End: 2024-05-17

## 2024-05-17 RX ORDER — FENTANYL CITRATE 50 UG/ML
INJECTION, SOLUTION INTRAMUSCULAR; INTRAVENOUS
Status: COMPLETED | OUTPATIENT
Start: 2024-05-17 | End: 2024-05-17

## 2024-05-17 RX ORDER — ALBUTEROL SULFATE 0.83 MG/ML
2.5 SOLUTION RESPIRATORY (INHALATION) ONCE
Status: DISCONTINUED | OUTPATIENT
Start: 2024-05-17 | End: 2024-05-18 | Stop reason: HOSPADM

## 2024-05-17 RX ORDER — MIDAZOLAM HYDROCHLORIDE 2 MG/2ML
INJECTION, SOLUTION INTRAMUSCULAR; INTRAVENOUS
Status: COMPLETED | OUTPATIENT
Start: 2024-05-17 | End: 2024-05-17

## 2024-05-17 RX ORDER — LIDOCAINE HYDROCHLORIDE 10 MG/ML
1 INJECTION INFILTRATION; PERINEURAL ONCE
Status: COMPLETED | OUTPATIENT
Start: 2024-05-17 | End: 2024-05-17

## 2024-05-17 RX ADMIN — MIDAZOLAM HYDROCHLORIDE 0.5 MG: 1 INJECTION, SOLUTION INTRAMUSCULAR; INTRAVENOUS at 08:05

## 2024-05-17 RX ADMIN — ALBUTEROL SULFATE 2.5 MG: 2.5 SOLUTION RESPIRATORY (INHALATION) at 09:05

## 2024-05-17 RX ADMIN — FENTANYL CITRATE 12.5 MCG: 50 INJECTION INTRAMUSCULAR; INTRAVENOUS at 08:05

## 2024-05-17 RX ADMIN — LIDOCAINE HYDROCHLORIDE 6 ML: 20 JELLY TOPICAL at 08:05

## 2024-05-17 RX ADMIN — LIDOCAINE HYDROCHLORIDE 4 ML: 10 INJECTION, SOLUTION INFILTRATION; PERINEURAL at 08:05

## 2024-05-17 RX ADMIN — MIDAZOLAM HYDROCHLORIDE 1 MG: 1 INJECTION, SOLUTION INTRAMUSCULAR; INTRAVENOUS at 08:05

## 2024-05-17 NOTE — PROGRESS NOTES
05/17/24 0733   Sedation Risk Screen   NPO Since: Date 05/17/24   NPO Since: Time 0001   Mallampati Scale Class II   ASA Classification Class 2   Assessments   HEENT Yes (See Comment)  (clear nasal passages)   Airway Assessment   Previous problems with anesthesia or sedation? No   Stridor, snoring or sleep apnea? No   Dysmorphic Facial Features? No   Beard? No   Edentulous? No   Thick/Abnormal Neck Anatomy? No   Tumor or mass in airway? No   Recent trauma or surgery to airway (<= 1 Month)? No   Radiation therapy to head, neck? No   Advanced rheumatoid arthritis with cervical pain? No   Immobile cervical spine? No   Complex congenital heart disease? No   Can patient easily accomodate 2 fingers width into mouth? No   Is the distance from the Tyler[place] to the tip of the chin => 3 finger widths? Yes   Can mandible (jaw) appropriately move forward? Yes   Sedation History and Plan   Previous Sedation History Yes   Sedation Effective Yes   Family History of Sedation Problems No   Consent Signed Yes   Sedation Plan Nurse Monitoring: Moderate

## 2024-05-17 NOTE — INTERVAL H&P NOTE
The patient has been examined and the H&P has been reviewed:    I concur with the findings and no changes have occurred since H&P was written.  77 yo F with chronic rhinitis, urticaria and eosinophilic asthma presents for evaluation of chronic cough with recurrent exacerbations requiring antibiotic and steroid therapy outpatient. She has known L lingula scarring vs consolidation with cylindrical bronchiectasis. Plan for bronchoscopy with BAL +/- brushing for evaluation. Moderate anesthesia. ASA score 2, Mallampati 2.   There are no hospital problems to display for this patient.

## 2024-05-17 NOTE — DISCHARGE INSTRUCTIONS
Procedure Date  5/17/24     Impression  Overall Impression:   All observed locations appeared normal, including the pharynx, larynx, vocal cords, trachea, main aubrie, left lung and right lung.  Dynamic airway collapse with 51-75% obstruction in the lower trachea  Minimal, thick and white secretions present in the RUL; performed therapeutic aspiration  Bronchoalveolar lavage was performed x1 in the lingular lobar bronchus; the fluid appeared cloudy        Recommendation  Follow up bronchoscopy, due: 6/12/2024     NO DRIVING, OPERATING EQUIPMENT, OR SIGNING LEGAL DOCUMENTS FOR 24 HOURS.  THE NURSE WILL CALL YOU WITH YOUR BIOPSY RESULTS IN A FEW DAYS. IF YOU HAVE  [facility] MYCHART YOUR RESULTS WILL APPEAR THERE AS WELL.  Please call the GI Lab if you have any nausea, vomiting, or abdominal pain.

## 2024-05-19 LAB
CULTURE, LOWER RESPIRATORY: NORMAL
GRAM STN SPEC: NORMAL
GRAM STN SPEC: NORMAL

## 2024-05-28 LAB — CULTURE, FUNGUS (OTHER): NORMAL

## 2024-06-12 ENCOUNTER — OFFICE VISIT (OUTPATIENT)
Dept: PULMONOLOGY | Facility: CLINIC | Age: 79
End: 2024-06-12
Payer: MEDICARE

## 2024-06-12 ENCOUNTER — TELEPHONE (OUTPATIENT)
Dept: PHARMACY | Facility: CLINIC | Age: 79
End: 2024-06-12

## 2024-06-12 VITALS
OXYGEN SATURATION: 100 % | HEART RATE: 74 BPM | DIASTOLIC BLOOD PRESSURE: 78 MMHG | RESPIRATION RATE: 16 BRPM | SYSTOLIC BLOOD PRESSURE: 136 MMHG

## 2024-06-12 DIAGNOSIS — J82.83 EOSINOPHILIC ASTHMA: ICD-10-CM

## 2024-06-12 DIAGNOSIS — J45.991 COUGH VARIANT ASTHMA: ICD-10-CM

## 2024-06-12 DIAGNOSIS — J45.50 SEVERE PERSISTENT ASTHMA WITHOUT COMPLICATION: Primary | ICD-10-CM

## 2024-06-12 DIAGNOSIS — J30.2 SEASONAL ALLERGIC RHINITIS DUE TO FUNGAL SPORES: ICD-10-CM

## 2024-06-12 PROCEDURE — 99214 OFFICE O/P EST MOD 30 MIN: CPT | Mod: PBBFAC | Performed by: STUDENT IN AN ORGANIZED HEALTH CARE EDUCATION/TRAINING PROGRAM

## 2024-06-12 PROCEDURE — 99999 PR PBB SHADOW E&M-EST. PATIENT-LVL IV: CPT | Mod: PBBFAC,,, | Performed by: STUDENT IN AN ORGANIZED HEALTH CARE EDUCATION/TRAINING PROGRAM

## 2024-06-12 PROCEDURE — 99214 OFFICE O/P EST MOD 30 MIN: CPT | Mod: S$PBB,,, | Performed by: STUDENT IN AN ORGANIZED HEALTH CARE EDUCATION/TRAINING PROGRAM

## 2024-06-12 RX ORDER — PREDNISONE 5 MG/1
10 TABLET ORAL DAILY
Qty: 30 TABLET | Refills: 3 | Status: SHIPPED | OUTPATIENT
Start: 2024-06-12

## 2024-06-12 RX ORDER — CETIRIZINE HYDROCHLORIDE 10 MG/1
10 TABLET ORAL DAILY
Qty: 30 TABLET | Refills: 11 | Status: SHIPPED | OUTPATIENT
Start: 2024-06-12 | End: 2025-06-12

## 2024-06-12 NOTE — ASSESSMENT & PLAN NOTE
77 yo F with poorly controlled eosinophilic asthma, chronic rhinitis and urticaria (previously on Xolair w/ exacerbations) presents for follow up evaluation with poorly controlled symptoms now on prednisone 10 mg Qday. Her regimen includes Tezspire SQ, Trelegy, montelukast, cetirizine and KVNG PRN. Prior evaluation has included lab work notable for IgE 432, SEos 580; pertinent neg include Aspergillus. Exacerbations have included infectious triggers (Bronch with BAL at OSH 01/2024 with Cx growing Haemophilus parainfluenza), environmental and undermedication. PFT with impressive BD response. CT Chest 03/2024 with chronic appearing lingula scar with no other overt evidence of airway or parenchymal disease. Now s/p bronchoscopy w/ BAL notable for 7% Eos consistent with bronchial asthma with ongoing inflammatory response (PMN predominant) and negative cultures; bronchoscopy was significant for bronchospasm as well. Constellation of symptoms is consistent with poorly controlled eosinophilic asthma that is now steroid dependent. Plan for management as follows:  - discussed at length importance of regimen adherence; recent initiation of prednisone 10 mg has been in the setting of cessation of cetirizine, montelukast and Trelegy inhaler use; she expresses understanding with the degree of her asthma severity and need for multimodal therapy to maintain control of symptoms  - cont Trelegy 100 Qday with KVNG PRN neb vs MDI   -- high out of pocket cost of inhaler therapy, will place Pharmacy consult for assistance  - cont montelukast QHS  - cont flonase Qday and cetirizine Qday   -- refill provided  - cont Prednisone 10 mg Qday   -- refill provided with plan to decrease dose to 5 mg in 4 weeks (07/12) following adherence of above maintenance regimen  - previously provided Pred50 script of Prednisone for use with worsening of respiratory symptoms to avoid delay in steroid initiation; she was advised to notify Pulmonary team in  event she starts therapy  - cont Tezspire, managed with Dr. Baumann at [facility]

## 2024-06-12 NOTE — PROGRESS NOTES
[facility]  Pulmonology  ESTABLISHED VISIT     Patient Name:  Theresa Fam  Primary Care Provider: Ho Brday DO  Date of Service: 06/12/2024    Chief Complaint: Cough    SUBJECTIVE   HPI:  Theresa Fam is a 78 y.o. 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 Baumann), hypothyroidism, chronic urticaria on Xolair and GERD who presents for follow up of bronchoscopy with BAL (05/2024). Last seen 03/2024 with plan for transition of biologice therapy following discussion with Dr. Baumann (Now on Tezpire). She is accompanied by her .     Sarwat reports some worsening of her breathing which has prompted her to start taking Prednisone 10 mg daily. She was previously using her nebulizer four times a day and is now taking it Qam and Qpm. She has stopped taking her singulair and is not taking her Trelegy as she only has 1 sample left. We discussed the result of her bronchoscopy.     Initial HPI  Sarwat reports worsening of her cough symptoms in the past 3 months. Her symptoms initially consistent of coughing paroxysms with associated shortness of breath. This evolved to having coughing episodes with no sleep interruption. She has a sensation of drainage at the back of her throat. She also has chest congestion. Her coughing episodes now with goal to relieve phlegm with expectorant a small volume green colored sputum. Her symptoms have progressed in severity requiring presentation to the ED 3 times since the start of this year. Outside records have been reviewed in Care Everywhere. She has improvement of symptoms while on steroid therapy as well as antibiotics and antitussives. She has history of CRSsNP. She follows with Allergy medicine where she is treated with Xolair injections since 2022; last Feb 12th 2024. She uses Trelegy daily now and has just received albuterol nebuilizer mediation and equipment. Her home in the past 3 weeks has undergone maintenance of the HVAC system and  cleaning of her carpeted rooms.  03/2024: reports having an acute episode overnight with coughing paroxysms and breathlessness. She had to use her nebulizer twice. Her symptoms were worrisome enough for her  to consider bringing her to the ED. She had improvement with breathing treatments. She remains short of breath today.   Review of OSH records notable for her undergoing bronchoscopy 01/05/2024 with culture growing Haemophilus parainfluenza.   05/20424: Sarwat has had an exacerbation in the past month characterized by increase in her coughing episodes and requiring a course of steroids (including dexamethasone) and antibiotics. She was started on Tezspire and has received her 2nd dose.    Past Medical History:   Diagnosis Date    Asthma     Thyroid disease        Past Surgical History:   Procedure Laterality Date    BRONCHOSCOPY  01/2024    CHOLECYSTECTOMY      EYE SURGERY Bilateral     cataract removal       Family History   Problem Relation Name Age of Onset    Breast cancer Maternal Aunt      Breast cancer Maternal Aunt      Breast cancer Maternal Aunt      Breast cancer Maternal Cousin      Breast cancer Maternal Cousin      Breast cancer Maternal Cousin          Social History     Socioeconomic History    Marital status:    Tobacco Use    Smoking status: Never     Passive exposure: Never    Smokeless tobacco: Never   Substance and Sexual Activity    Alcohol use: Never    Drug use: Never    Sexual activity: Yes     Partners: Male       Social History     Social History Narrative    Not on file       Review of patient's allergies indicates:   Allergen Reactions    Amoxil [amoxicillin]     Rocephin [ceftriaxone]         Medications: Medications reviewed to include over the counter medications.    Review of Systems: A focused ROS was completed and found to be negative except for that mentioned above.      OBJECTIVE   PHYSICAL EXAM:  Vitals:    06/12/24 1037   BP: 136/78   BP Location: Left arm    Patient Position: Sitting   BP Method: Large (Manual)   Pulse: 74   Resp: 16   SpO2: 100%          GENERAL: NAD  HEENT: normocephalic, non-icteric conjunctivae, moist oral mucosa  RESPIRATORY: normal pulmonary effort, on RA  CARDIOVASCULAR: Regular rate and rhythm, no murmurs rubs or gallops.  SKIN: no rash, jaundice, ecchymosis or ulcers  MUSCULOSKELETAL: No clubbing or cyanosis; no pedal edema  NEUROLOGIC: AO ×3, no gross deficits    LABS:  Lab studies reviewed and notable for IgE 432, SEos 580, Ig Quant panel (IgG,A,M) wnl  OSH CBC 02/2024:  H/H 14.3/44.2, WBC 7.56, serum eosinophils 1000 (13.4%); , , T bili 0.40, , creatinine 1.10, BNP 14.3    BAL DIFF 05/2024:   Latest Reference Range & Units 05/17/24 08:32   Color, Body Fluid  Colorless   Clarity, Body Fluid Clear  Clear   RBC, Body Fluid /cumm <3,000   Cell Count Excluding RBCs <=150 /cumm 74   Polys, Fluid Man % 0 - 25 % 31 (H)   Lymphs, Fluid % 29   Macrophages, Fluid Man % % 13   Monocytes, Fluid Man % % 20   Eos, Fluid % 7     IMAGING:  Imaging reviewed and notable for CT Chest 03/2024 with Lingular scarring over fissure with mild cylindical bronchiectasis, no masses,  no emphysema, no pleural effusion       CXR 01/2024 OSH:  FINDINGS:The cardiovascular and mediastinal contours unremarkable.     OSH CXR 02/2024: Heart size is within normal limits.  Chronic change of lung with a focal consolidation, pleural effusion or pneumothorax.  Visualized osseous with surrounding soft tissue structures appear grossly unchanged.    LUNG FUNCTION TESTING:    SPIROMETRY/PFT:  03/2024 Pre Post   FVC 1.84/-1.86 2.27/-0.94 +BD   FEV1 1.01/-2.84 1.46/-1.69 +BD   FEV1/FVC 55/-2.44 64/-1.52   TLC 3.50/-2.59    FRC  2.05/-1.47    RV 1.34/-2.17    DLCO 13.11/-2.31    NOTE: PFT while in acute exacerbation      ASSESSMENT & PLAN     1. Severe persistent asthma without complication  Assessment & Plan:  Management as per eosinophilic asthma plan    Orders:  -      Ambulatory referral/consult to Pharmacy Assistance; Future; Expected date: 06/19/2024    2. Eosinophilic asthma  Assessment & Plan:  79 yo F with poorly controlled eosinophilic asthma, chronic rhinitis and urticaria (previously on Xolair w/ exacerbations) presents for follow up evaluation with poorly controlled symptoms now on prednisone 10 mg Qday. Her regimen includes Tezspire SQ, Trelegy, montelukast, cetirizine and KVNG PRN. Prior evaluation has included lab work notable for IgE 432, SEos 580; pertinent neg include Aspergillus. Exacerbations have included infectious triggers (Bronch with BAL at OSH 01/2024 with Cx growing Haemophilus parainfluenza), environmental and undermedication. PFT with impressive BD response. CT Chest 03/2024 with chronic appearing lingula scar with no other overt evidence of airway or parenchymal disease. Now s/p bronchoscopy w/ BAL notable for 7% Eos consistent with bronchial asthma with ongoing inflammatory response (PMN predominant) and negative cultures; bronchoscopy was significant for bronchospasm as well. Constellation of symptoms is consistent with poorly controlled eosinophilic asthma that is now steroid dependent. Plan for management as follows:  - discussed at length importance of regimen adherence; recent initiation of prednisone 10 mg has been in the setting of cessation of cetirizine, montelukast and Trelegy inhaler use; she expresses understanding with the degree of her asthma severity and need for multimodal therapy to maintain control of symptoms  - cont Trelegy Qday with KVNG PRN neb vs MDI   -- high out of pocket cost of inhaler therapy, will place Pharmacy consult for assistance  - cont montelukast QHS  - cont flonase Qday and cetirizine Qday   -- refill provided  - cont Prednisone 10 mg Qday   -- refill provided with plan to decrease dose to 5 mg in 4 weeks (07/12) following adherence of above maintenance regimen  - previously provided Pred50 script of Prednisone  for use with worsening of respiratory symptoms to avoid delay in steroid initiation; she was advised to notify Pulmonary team in event she starts therapy  - cont Tezspire, managed with Dr. Baumann at [facility]      Orders:  -     predniSONE (DELTASONE) 5 MG tablet; Take 2 tablets (10 mg total) by mouth once daily.  Dispense: 30 tablet; Refill: 3    3. Seasonal allergic rhinitis due to fungal spores  Assessment & Plan:  Refill for cetirizine provided. To continue use daily.    Orders:  -     cetirizine (ZYRTEC) 10 MG tablet; Take 1 tablet (10 mg total) by mouth once daily.  Dispense: 30 tablet; Refill: 11          Follow up in about 2 months (around 8/12/2024).       Case was discussed with patient; all questions were answered to patient's satisfaction and patient verbalized understanding.     Angela Alex MD  Pulmonary Medicine  [facility]  Phone: [phone]

## 2024-06-12 NOTE — LETTER
June 18, 2024    Theresa Fam  [address] [place] [address]             [facility] - Pharmacy Assistance  [address]  [place] [address]  Phone: [phone]  Fax: [phone] Dear Theresa Fam              My name is Galdino Ulrich and I work with the Pharmacy Patient Assistance Program here at [facility].  We received a referral from your provider, inquiring about assistance with your medication. We tried to contact you to discuss your assistance options, sorry we missed you. If you are still in need of assistance, please reach out to the number listed below.   Please be advised enrollment in The Pharmacy Patient Assistance Program will require the following documentation.             Proof of household income (such as tax return, social security award letter, pension/group home statements)   Copy of Insurance (front and back) Cards   Printout from your pharmacy or insurance that shows how much money you have spent on prescription -pays this (2022) year   Signed HIPAA Auth/Medication Access Forms (see attached)            Thank you for choosing [facility] for your healthcare needs    Sincerely  Galdino PUENTE @[phone]           Pharmacy Patient Assistance  [address]  [address]  [place] [address]  Fax: [phone]  Email: [email or web address]

## 2024-06-12 NOTE — TELEPHONE ENCOUNTER
Theresa Fam has been informed of the [employer] application process for Trelegy and what's required to apply.  @[facility]@ will provide the following documents: Printout from your Insurance or Pharmacy that shows how much you have spent on prescriptions this year       Follow-up will be made in 5 business days.

## 2024-06-12 NOTE — PROGRESS NOTES
Patient returned my phone.  Explained to the patient that the drug company wants the patient to spend 600.00 OOP on medications for the year.  Patient said she is going to the pharmacy I suggested that she get a print out of her expenses to see where she is at.  Once she reaches the 600.00 we can do the application.  Patient understands

## 2024-06-18 LAB — MYCOBACTERIUM SPEC CULT: NORMAL

## 2024-06-18 NOTE — TELEPHONE ENCOUNTER
Theresa Fam was scheduled on 06/12/2024 to provide the following documentation to initiate the application process.        Proof of household Income( such as social security statement, 1099 form, pension statement or 3 consecutive pay stubs  Copy of all Insurance cards( front and back)  Printout from your Insurance or Pharmacy that shows how much you have spent on prescriptions this year  Signed and dated HIPAA /Patient Information Forms         As of today, the documents have not been received.  Reminder letter sent. We are unable to move forward with application process until requested documentation is received. Requested documentation can be submitted by email or fax to the Pharmacy Patient Assistance Team.        Pharmacy Patient Assistance

## 2024-06-18 NOTE — TELEPHONE ENCOUNTER
A 2nd attempt has been made to establish contact with Theresa  via mailed a Letter. The final contact attempt will be made in 5 business days

## 2024-07-02 ENCOUNTER — OFFICE VISIT (OUTPATIENT)
Dept: DERMATOLOGY | Facility: CLINIC | Age: 79
End: 2024-07-02
Payer: MEDICARE

## 2024-07-02 DIAGNOSIS — Z85.828 HISTORY OF NONMELANOMA SKIN CANCER: ICD-10-CM

## 2024-07-02 DIAGNOSIS — L57.0 ACTINIC KERATOSES: Primary | ICD-10-CM

## 2024-07-02 DIAGNOSIS — L82.1 SEBORRHEIC KERATOSES: ICD-10-CM

## 2024-07-02 PROCEDURE — 17003 DESTRUCT PREMALG LES 2-14: CPT | Mod: ,,, | Performed by: STUDENT IN AN ORGANIZED HEALTH CARE EDUCATION/TRAINING PROGRAM

## 2024-07-02 PROCEDURE — 99213 OFFICE O/P EST LOW 20 MIN: CPT | Mod: 25,,, | Performed by: STUDENT IN AN ORGANIZED HEALTH CARE EDUCATION/TRAINING PROGRAM

## 2024-07-02 PROCEDURE — 17000 DESTRUCT PREMALG LESION: CPT | Mod: ,,, | Performed by: STUDENT IN AN ORGANIZED HEALTH CARE EDUCATION/TRAINING PROGRAM

## 2024-07-02 NOTE — PROGRESS NOTES
[facility]  Ryne Corea MD    [address] [place] [address]  [phone]    Fax: [phone]    Patient Name: Theresa Fam  Medical Record Number: [id number]  PCP: Ho Brady DO  Age: 79 y.o. : 1945  Contact: [phone] (home)     History of Present Illness:     Theresa Fam is a 79 y.o.  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 Patient is concerned with right upper lip and left cheek.     The patient has no other concerns today.    Review of Systems:     Unremarkable other than mentioned above.     Physical Exam:     General: Relaxed, oriented, alert    Skin examination of the scalp, face, neck, chest, back, abdomen, upper extremities and lower extremities were normal except for as listed below      Assessment and Plan:     1. History of NMSC   Well-healed scar on right elbow and right nasal sidewall   No e/o recurrence   Recommend sunscreen and good photoprotection       2.Actinic Keratoses  Erythematous, scaly papules on right upper lip, left cheek, right cheek, nose, right hand,     Plan: Liquid Nitrogen.  A total of 5 lesions were treated with liquid nitrogen for 2 freeze-thaw cycles lasting 5 seconds, located on the above locations.   The patient's consent was obtained including but not limited to risks of crusting, scabbing,  blistering, scarring, darker or lighter pigmentary change, recurrence, incomplete removal and infection.    Counseling.  Sun protective clothing and broad spectrum sunscreen can prevent the formation of AK.   AKs can be treated with cryotherapy, photodynamic therapy, imiquimod, topical 5-FU.  Actinic Keratoses are precancerous proliferations that occur within sun damaged skin. If untreated,  a small subset of AKs can develop into Squamous Cell Carcinoma.      3. Seborrheic keratoses   - brown stuck on appearing papules/plaques  - patient educated on benign  nature. No treatment necessary unless they become irritated or inflamed       Return to clinic in 6 months.     AVS printed with patient instructions     Ryne Corea MD   Mohs Surgery/Dermatologic Oncology  Dermatology

## 2024-07-03 RX ORDER — LEVOTHYROXINE SODIUM 50 UG/1
50 TABLET ORAL
Qty: 90 TABLET | Refills: 3 | Status: SHIPPED | OUTPATIENT
Start: 2024-07-03

## 2024-07-03 NOTE — TELEPHONE ENCOUNTER
Called to inform pt we will get med sent over. No answer. Vm left explaining this and a call back number.

## 2024-07-03 NOTE — TELEPHONE ENCOUNTER
----- Message from Rhonda Kirk sent at 7/3/2024  8:53 AM CDT -----  Who Called: Theresa Fam    Refill or New Rx:Refill  RX Name and Strength: levothyroxine (SYNTHROID) 50 MCG tablet  List of preferred pharmacies on file (remove unneeded): [facility] [address]        Preferred Method of Contact: Phone Call  Patient's Preferred Phone Number on File: [phone]   Best Call Back Number, if different:  Additional Information:

## 2024-07-30 DIAGNOSIS — Z12.31 BREAST CANCER SCREENING BY MAMMOGRAM: Primary | ICD-10-CM

## 2024-07-31 ENCOUNTER — OFFICE VISIT (OUTPATIENT)
Dept: PULMONOLOGY | Facility: CLINIC | Age: 79
End: 2024-07-31
Payer: MEDICARE

## 2024-07-31 VITALS
OXYGEN SATURATION: 96 % | RESPIRATION RATE: 16 BRPM | HEIGHT: 65 IN | HEART RATE: 76 BPM | DIASTOLIC BLOOD PRESSURE: 72 MMHG | WEIGHT: 161 LBS | SYSTOLIC BLOOD PRESSURE: 134 MMHG | BODY MASS INDEX: 26.82 KG/M2

## 2024-07-31 DIAGNOSIS — J82.83 EOSINOPHILIC ASTHMA: ICD-10-CM

## 2024-07-31 PROCEDURE — 99999 PR PBB SHADOW E&M-EST. PATIENT-LVL IV: CPT | Mod: PBBFAC,,, | Performed by: STUDENT IN AN ORGANIZED HEALTH CARE EDUCATION/TRAINING PROGRAM

## 2024-07-31 PROCEDURE — 99214 OFFICE O/P EST MOD 30 MIN: CPT | Mod: PBBFAC | Performed by: STUDENT IN AN ORGANIZED HEALTH CARE EDUCATION/TRAINING PROGRAM

## 2024-07-31 RX ORDER — PREDNISONE 5 MG/1
10 TABLET ORAL DAILY
Qty: 30 TABLET | Refills: 11 | Status: SHIPPED | OUTPATIENT
Start: 2024-07-31

## 2024-07-31 NOTE — PROGRESS NOTES
[facility]  Pulmonology  ESTABLISHED VISIT     Patient Name:  Theresa Fam  Primary Care Provider: Ho Brady DO  Date of Service: 07/31/2024    Chief Complaint: Cough    SUBJECTIVE   HPI:  Theresa Fam is a 79 y.o. 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 Baumann), hypothyroidism, chronic urticaria on Xolair and GERD who presents for follow up of bronchoscopy with BAL (05/2024). Last seen 06/2024. She is accompanied by her .     Sarwat reports episode of coughing that was associated with shortness of breath.  Cough was nonproductive.  Symptoms have improved today when compared to prior.  She was run out of samples of Trelegy and has missed doses of her daily regimen.  We discussed at length her asthma diagnosis and expected symptoms with exacerbations.  She continues on prednisone 10 mg daily.      Initial HPI  Sarwat reports worsening of her cough symptoms in the past 3 months. Her symptoms initially consistent of coughing paroxysms with associated shortness of breath. This evolved to having coughing episodes with no sleep interruption. She has a sensation of drainage at the back of her throat. She also has chest congestion. Her coughing episodes now with goal to relieve phlegm with expectorant a small volume green colored sputum. Her symptoms have progressed in severity requiring presentation to the ED 3 times since the start of this year. Outside records have been reviewed in Care Everywhere. She has improvement of symptoms while on steroid therapy as well as antibiotics and antitussives. She has history of CRSsNP. She follows with Allergy medicine where she is treated with Xolair injections since 2022; last Feb 12th 2024. She uses Trelegy daily now and has just received albuterol nebuilizer mediation and equipment. Her home in the past 3 weeks has undergone maintenance of the HVAC system and cleaning of her carpeted rooms.  03/2024: reports having an acute episode  overnight with coughing paroxysms and breathlessness. She had to use her nebulizer twice. Her symptoms were worrisome enough for her  to consider bringing her to the ED. She had improvement with breathing treatments. She remains short of breath today.   Review of OSH records notable for her undergoing bronchoscopy 01/05/2024 with culture growing Haemophilus parainfluenza.   05/20424: Sarwat has had an exacerbation in the past month characterized by increase in her coughing episodes and requiring a course of steroids (including dexamethasone) and antibiotics. She was started on Tezspire and has received her 2nd dose.    Past Medical History:   Diagnosis Date    Asthma     Thyroid disease        Past Surgical History:   Procedure Laterality Date    BRONCHOSCOPY  01/2024    CHOLECYSTECTOMY      EYE SURGERY Bilateral     cataract removal       Family History   Problem Relation Name Age of Onset    Breast cancer Maternal Aunt      Breast cancer Maternal Aunt      Breast cancer Maternal Aunt      Breast cancer Maternal Cousin      Breast cancer Maternal Cousin      Breast cancer Maternal Cousin          Social History     Socioeconomic History    Marital status:    Tobacco Use    Smoking status: Never     Passive exposure: Never    Smokeless tobacco: Never   Substance and Sexual Activity    Alcohol use: Never    Drug use: Never    Sexual activity: Yes     Partners: Male       Social History     Social History Narrative    Not on file       Review of patient's allergies indicates:   Allergen Reactions    Amoxil [amoxicillin]     Rocephin [ceftriaxone]         Medications: Medications reviewed to include over the counter medications.    Review of Systems: A focused ROS was completed and found to be negative except for that mentioned above.      OBJECTIVE   PHYSICAL EXAM:  Vitals:    07/31/24 1054   BP: 134/72   BP Location: Left arm   Patient Position: Sitting   BP Method: Large (Manual)   Pulse: 76   Resp: 16  "  SpO2: 96%   Weight: 73 kg (161 lb)   Height: 5' 5" (1.651 m)          GENERAL: NAD  HEENT: normocephalic, non-icteric conjunctivae, moist oral mucosa  RESPIRATORY: normal pulmonary effort, on RA  CARDIOVASCULAR: Regular rate and rhythm, no murmurs rubs or gallops.  SKIN: no rash, jaundice, ecchymosis or ulcers  MUSCULOSKELETAL: No clubbing or cyanosis; no pedal edema  NEUROLOGIC: AO ×3, no gross deficits    LABS:  Lab studies reviewed and notable for IgE 432, SEos 580, Ig Quant panel (IgG,A,M) wnl  OSH CBC 02/2024:  H/H 14.3/44.2, WBC 7.56, serum eosinophils 1000 (13.4%); , , T bili 0.40, , creatinine 1.10, BNP 14.3    BAL DIFF 05/2024:   Latest Reference Range & Units 05/17/24 08:32   Color, Body Fluid  Colorless   Clarity, Body Fluid Clear  Clear   RBC, Body Fluid /cumm <3,000   Cell Count Excluding RBCs <=150 /cumm 74   Polys, Fluid Man % 0 - 25 % 31 (H)   Lymphs, Fluid % 29   Macrophages, Fluid Man % % 13   Monocytes, Fluid Man % % 20   Eos, Fluid % 7     IMAGING:  Imaging reviewed and notable for CT Chest 03/2024 with Lingular scarring over fissure with mild cylindical bronchiectasis, no masses,  no emphysema, no pleural effusion       CXR 01/2024 OSH:  FINDINGS:The cardiovascular and mediastinal contours unremarkable.     OSH CXR 02/2024: Heart size is within normal limits.  Chronic change of lung with a focal consolidation, pleural effusion or pneumothorax.  Visualized osseous with surrounding soft tissue structures appear grossly unchanged.    LUNG FUNCTION TESTING:    SPIROMETRY/PFT:  03/2024 Pre Post   FVC 1.84/-1.86 2.27/-0.94 +BD   FEV1 1.01/-2.84 1.46/-1.69 +BD   FEV1/FVC 55/-2.44 64/-1.52   TLC 3.50/-2.59    FRC  2.05/-1.47    RV 1.34/-2.17    DLCO 13.11/-2.31    NOTE: PFT while in acute exacerbation      ASSESSMENT & PLAN     1. Eosinophilic asthma  Assessment & Plan:  78 yo F with poorly controlled eosinophilic asthma, chronic rhinitis and urticaria (previously on Xolair w/ " exacerbations) presents for follow up evaluation with poorly controlled symptoms now on prednisone 10 mg Qday. Her regimen includes Tezspire SQ, Trelegy, montelukast, cetirizine and KVNG PRN. Prior evaluation has included lab work notable for IgE 432, SEos 580; pertinent neg include Aspergillus. Exacerbations have included infectious triggers (Bronch with BAL at OSH 01/2024 with Cx growing Haemophilus parainfluenza), environmental and undermedication. PFT with impressive BD response. CT Chest 03/2024 with chronic appearing lingula scar with no other overt evidence of airway or parenchymal disease. Now s/p bronchoscopy w/ BAL notable for 7% Eos consistent with bronchial asthma with ongoing inflammatory response (PMN predominant) and negative cultures; bronchoscopy was significant for bronchospasm as well. Constellation of symptoms is consistent with poorly controlled eosinophilic asthma that is now steroid dependent. Plan for management as follows:  - discussed at length importance of regimen adherence  -- 06/2024 in setting of cessation of cetirizine, montelukast and Trelegy inhaler use  -- 07/2024 off Trelegy  - cont Trelegy 100 Qday with KVNG PRN neb vs MDI, refills provided   -- interruptions in her maintenance therapies a significant contributing factor for her intermittent worsening asthma control  - cont montelukast QHS  - cont flonase Qday and cetirizine Qday  - cont Prednisone 10 mg Qday  - cont Tezspire, managed with Dr. Baumann at [facility]      Orders:  -     predniSONE (DELTASONE) 5 MG tablet; Take 2 tablets (10 mg total) by mouth once daily.  Dispense: 30 tablet; Refill: 11          Follow up in about 5 months (around 12/31/2024) for Routine follow up.       Case was discussed with patient; all questions were answered to patient's satisfaction and patient verbalized understanding.     Angela Alex MD  Pulmonary Medicine  [facility]  Phone: [phone]

## 2024-08-02 NOTE — ASSESSMENT & PLAN NOTE
80 yo F with poorly controlled eosinophilic asthma, chronic rhinitis and urticaria (previously on Xolair w/ exacerbations) presents for follow up evaluation with poorly controlled symptoms now on prednisone 10 mg Qday. Her regimen includes Tezspire SQ, Trelegy, montelukast, cetirizine and KVNG PRN. Prior evaluation has included lab work notable for IgE 432, SEos 580; pertinent neg include Aspergillus. Exacerbations have included infectious triggers (Bronch with BAL at OSH 01/2024 with Cx growing Haemophilus parainfluenza), environmental and undermedication. PFT with impressive BD response. CT Chest 03/2024 with chronic appearing lingula scar with no other overt evidence of airway or parenchymal disease. Now s/p bronchoscopy w/ BAL notable for 7% Eos consistent with bronchial asthma with ongoing inflammatory response (PMN predominant) and negative cultures; bronchoscopy was significant for bronchospasm as well. Constellation of symptoms is consistent with poorly controlled eosinophilic asthma that is now steroid dependent. Plan for management as follows:  - discussed at length importance of regimen adherence  -- 06/2024 in setting of cessation of cetirizine, montelukast and Trelegy inhaler use  -- 07/2024 off Trelegy  - cont Trelegy 100 Qday with KVNG PRN neb vs MDI, refills provided   -- interruptions in her maintenance therapies a significant contributing factor for her intermittent worsening asthma control  - cont montelukast QHS  - cont flonase Qday and cetirizine Qday  - cont Prednisone 10 mg Qday  - cont Tezspire, [facility] with [facility] at [facility]

## 2024-08-28 ENCOUNTER — TELEPHONE (OUTPATIENT)
Dept: PULMONOLOGY | Facility: CLINIC | Age: 79
End: 2024-08-28
Payer: MEDICARE

## 2024-08-28 NOTE — TELEPHONE ENCOUNTER
Spoke to the patient voiced need refills. Voiced to the patient  prescribed 11 refills on pred. And 2 refills on albuterol. Patient voiced she will check with the pharmacy and give office a call if  needed. No other questions or concerns all day.

## 2024-08-28 NOTE — TELEPHONE ENCOUNTER
----- Message from Rhonda Kirk sent at 8/28/2024  3:49 PM CDT -----  Who Called: Theresa aFm    Refill or New Rx:Refill  RX Name and Strength: albuterol (PROVENTIL) 2.5 mg /3 mL (0.083 %) nebulizer solution and predniSONE (DELTASONE) 5 MG tablet  Is this a 30 day or 90 day RX: requesting 90 day supply  List of preferred pharmacies on file (remove unneeded): [facility] [address]        Preferred Method of Contact: Phone Call  Patient's Preferred Phone Number on File: [phone]   Best Call Back Number, if different:  Additional Information:

## 2024-09-13 ENCOUNTER — HOSPITAL ENCOUNTER (OUTPATIENT)
Dept: RADIOLOGY | Facility: HOSPITAL | Age: 79
Discharge: HOME | End: 2024-09-13
Attending: FAMILY MEDICINE
Payer: MEDICARE

## 2024-09-13 VITALS — WEIGHT: 165 LBS | BODY MASS INDEX: 27.49 KG/M2 | HEIGHT: 65 IN

## 2024-09-13 DIAGNOSIS — Z12.31 BREAST CANCER SCREENING BY MAMMOGRAM: ICD-10-CM

## 2024-09-13 PROCEDURE — 77063 BREAST TOMOSYNTHESIS BI: CPT | Mod: 26,,, | Performed by: RADIOLOGY

## 2024-09-13 PROCEDURE — 77067 SCR MAMMO BI INCL CAD: CPT | Mod: TC

## 2024-09-13 PROCEDURE — 77067 SCR MAMMO BI INCL CAD: CPT | Mod: 26,,, | Performed by: RADIOLOGY

## 2024-10-09 DIAGNOSIS — Z71.89 COMPLEX CARE COORDINATION: ICD-10-CM

## 2024-10-25 ENCOUNTER — OFFICE VISIT (OUTPATIENT)
Dept: FAMILY MEDICINE | Facility: CLINIC | Age: 79
End: 2024-10-25
Payer: MEDICARE

## 2024-10-25 VITALS
SYSTOLIC BLOOD PRESSURE: 132 MMHG | BODY MASS INDEX: 28.66 KG/M2 | HEART RATE: 72 BPM | DIASTOLIC BLOOD PRESSURE: 84 MMHG | TEMPERATURE: 98 F | OXYGEN SATURATION: 96 % | WEIGHT: 172 LBS | HEIGHT: 65 IN

## 2024-10-25 DIAGNOSIS — R05.3 CHRONIC COUGH: ICD-10-CM

## 2024-10-25 DIAGNOSIS — J45.50 SEVERE PERSISTENT ASTHMA WITHOUT COMPLICATION: Primary | ICD-10-CM

## 2024-10-25 RX ORDER — METHYLPREDNISOLONE ACETATE 40 MG/ML
40 INJECTION, SUSPENSION INTRA-ARTICULAR; INTRALESIONAL; INTRAMUSCULAR; SOFT TISSUE
Status: COMPLETED | OUTPATIENT
Start: 2024-10-25 | End: 2024-10-25

## 2024-10-25 RX ORDER — DEXAMETHASONE SODIUM PHOSPHATE 4 MG/ML
4 INJECTION, SOLUTION INTRA-ARTICULAR; INTRALESIONAL; INTRAMUSCULAR; INTRAVENOUS; SOFT TISSUE
Status: COMPLETED | OUTPATIENT
Start: 2024-10-25 | End: 2024-10-25

## 2024-10-25 RX ADMIN — METHYLPREDNISOLONE ACETATE 40 MG: 40 INJECTION, SUSPENSION INTRA-ARTICULAR; INTRALESIONAL; INTRAMUSCULAR; SOFT TISSUE at 09:10

## 2024-10-25 RX ADMIN — DEXAMETHASONE SODIUM PHOSPHATE 4 MG: 4 INJECTION, SOLUTION INTRA-ARTICULAR; INTRALESIONAL; INTRAMUSCULAR; INTRAVENOUS; SOFT TISSUE at 09:10

## 2024-10-25 NOTE — PROGRESS NOTES
Subjective     Patient ID: Theresa Fam is a 79 y.o. female.    Chief Complaint: Asthma and Cough    Pt presents with wheezing and cough x 1 week.       Review of Systems   Constitutional:  Negative for activity change, appetite change, chills, fatigue and fever.   HENT:  Negative for nasal congestion, ear discharge, nosebleeds, postnasal drip, rhinorrhea, sinus pressure/congestion, sneezing, sore throat and tinnitus.    Eyes:  Negative for pain, discharge, redness and itching.   Respiratory:  Positive for cough and wheezing. Negative for choking, chest tightness and shortness of breath.    Cardiovascular:  Negative for chest pain.   Gastrointestinal:  Negative for abdominal distention, abdominal pain, blood in stool, change in bowel habit, constipation, diarrhea, nausea and vomiting.   Genitourinary:  Negative for decreased urine volume, dysuria, flank pain and frequency.   Musculoskeletal:  Negative for back pain and gait problem.   Integumentary:  Negative for wound, breast mass and breast discharge.   Allergic/Immunologic: Negative for immunocompromised state.   Neurological:  Negative for dizziness, light-headedness and headaches.   Psychiatric/Behavioral:  Negative for agitation, behavioral problems and hallucinations.    Breast: Negative for mass         Objective     Physical Exam  Vitals and nursing note reviewed.   Constitutional:       Appearance: Normal appearance.   HENT:      Head: Normocephalic.      Right Ear: Tympanic membrane normal.      Left Ear: Tympanic membrane normal.      Nose: Nose normal.   Eyes:      Conjunctiva/sclera: Conjunctivae normal.   Cardiovascular:      Rate and Rhythm: Normal rate and regular rhythm.      Heart sounds: Normal heart sounds.   Pulmonary:      Effort: Pulmonary effort is normal.      Breath sounds: Normal breath sounds.   Musculoskeletal:         General: Normal range of motion.   Neurological:      Mental Status: She is alert and oriented to person, place, and  time.   Psychiatric:         Mood and Affect: Mood normal.         Behavior: Behavior normal.            Assessment and Plan     1. Severe persistent asthma without complication  -     dexAMETHasone injection 4 mg  -     methylPREDNISolone acetate injection 40 mg    2. Chronic cough        Go to the ed with any worsening symptoms. Make an appt with PCP for labs and regular visit.          Follow up if symptoms worsen or fail to improve.

## 2024-10-28 ENCOUNTER — OFFICE VISIT (OUTPATIENT)
Dept: PULMONOLOGY | Facility: CLINIC | Age: 79
End: 2024-10-28
Payer: MEDICARE

## 2024-10-28 VITALS
RESPIRATION RATE: 16 BRPM | SYSTOLIC BLOOD PRESSURE: 148 MMHG | OXYGEN SATURATION: 97 % | BODY MASS INDEX: 28.65 KG/M2 | HEIGHT: 65 IN | DIASTOLIC BLOOD PRESSURE: 78 MMHG | WEIGHT: 171.94 LBS | HEART RATE: 86 BPM

## 2024-10-28 DIAGNOSIS — J30.2 SEASONAL ALLERGIC RHINITIS, UNSPECIFIED TRIGGER: Primary | ICD-10-CM

## 2024-10-28 DIAGNOSIS — J82.83 EOSINOPHILIC ASTHMA: ICD-10-CM

## 2024-10-28 PROCEDURE — 99214 OFFICE O/P EST MOD 30 MIN: CPT | Mod: S$PBB,,, | Performed by: STUDENT IN AN ORGANIZED HEALTH CARE EDUCATION/TRAINING PROGRAM

## 2024-10-28 PROCEDURE — 99999 PR PBB SHADOW E&M-EST. PATIENT-LVL IV: CPT | Mod: PBBFAC,,, | Performed by: STUDENT IN AN ORGANIZED HEALTH CARE EDUCATION/TRAINING PROGRAM

## 2024-10-28 PROCEDURE — 99214 OFFICE O/P EST MOD 30 MIN: CPT | Mod: PBBFAC | Performed by: STUDENT IN AN ORGANIZED HEALTH CARE EDUCATION/TRAINING PROGRAM

## 2024-10-28 RX ORDER — ALBUTEROL SULFATE 0.83 MG/ML
2.5 SOLUTION RESPIRATORY (INHALATION) EVERY 6 HOURS PRN
Qty: 120 ML | Refills: 11 | Status: SHIPPED | OUTPATIENT
Start: 2024-10-28 | End: 2025-10-28

## 2024-10-28 RX ORDER — PREDNISONE 5 MG/1
5 TABLET ORAL DAILY
Start: 2024-10-28

## 2024-10-28 RX ORDER — AZELASTINE 1 MG/ML
2 SPRAY, METERED NASAL 2 TIMES DAILY
Qty: 30 ML | Refills: 0 | Status: SHIPPED | OUTPATIENT
Start: 2024-10-28 | End: 2025-10-28

## 2024-11-25 ENCOUNTER — CLINICAL SUPPORT (OUTPATIENT)
Dept: FAMILY MEDICINE | Facility: CLINIC | Age: 79
End: 2024-11-25
Payer: MEDICARE

## 2024-11-25 ENCOUNTER — TELEPHONE (OUTPATIENT)
Dept: FAMILY MEDICINE | Facility: CLINIC | Age: 79
End: 2024-11-25
Payer: MEDICARE

## 2024-11-25 DIAGNOSIS — Z13.220 ENCOUNTER FOR SCREENING FOR LIPID DISORDER: ICD-10-CM

## 2024-11-25 DIAGNOSIS — Z79.899 ON LONG TERM DRUG THERAPY: ICD-10-CM

## 2024-11-25 DIAGNOSIS — Z13.1 ENCOUNTER FOR SCREENING FOR DIABETES MELLITUS: ICD-10-CM

## 2024-11-25 DIAGNOSIS — Z23 FLU VACCINE NEED: Primary | ICD-10-CM

## 2024-11-25 DIAGNOSIS — E03.9 HYPOTHYROIDISM, UNSPECIFIED TYPE: Primary | ICD-10-CM

## 2024-11-25 PROCEDURE — 90662 IIV NO PRSV INCREASED AG IM: CPT | Mod: ,,, | Performed by: INTERNAL MEDICINE

## 2024-11-25 PROCEDURE — G0008 ADMIN INFLUENZA VIRUS VAC: HCPCS | Mod: ,,, | Performed by: INTERNAL MEDICINE

## 2024-11-25 NOTE — TELEPHONE ENCOUNTER
----- Message from Fany sent at 11/25/2024  9:25 AM CST -----  Who Called: Theresa Fam    Patient called wanting to come in for flu shot and blood work and also to see Dr. Brady. Please call her back. Thanks      Preferred Method of Contact: Phone Call  Patient's Preferred Phone Number on File: [phone]

## 2024-11-25 NOTE — TELEPHONE ENCOUNTER
Spoke to pt and she is coming in today for flu shot. F/u appt has also been made. I informed pt I will discuss with dr jacobs about labs and let her know once she is here for appt. Pt thanked me.

## 2024-12-02 ENCOUNTER — OFFICE VISIT (OUTPATIENT)
Dept: FAMILY MEDICINE | Facility: CLINIC | Age: 79
End: 2024-12-02
Payer: MEDICARE

## 2024-12-02 VITALS
DIASTOLIC BLOOD PRESSURE: 82 MMHG | WEIGHT: 176.69 LBS | BODY MASS INDEX: 29.44 KG/M2 | OXYGEN SATURATION: 95 % | HEIGHT: 65 IN | HEART RATE: 68 BPM | TEMPERATURE: 97 F | SYSTOLIC BLOOD PRESSURE: 138 MMHG

## 2024-12-02 DIAGNOSIS — E03.9 HYPOTHYROIDISM, UNSPECIFIED TYPE: ICD-10-CM

## 2024-12-02 DIAGNOSIS — J45.50 SEVERE PERSISTENT ASTHMA WITHOUT COMPLICATION: ICD-10-CM

## 2024-12-02 DIAGNOSIS — J82.83 EOSINOPHILIC ASTHMA: ICD-10-CM

## 2024-12-02 DIAGNOSIS — K21.9 GASTROESOPHAGEAL REFLUX DISEASE, UNSPECIFIED WHETHER ESOPHAGITIS PRESENT: ICD-10-CM

## 2024-12-02 DIAGNOSIS — R74.8 ELEVATED LIVER ENZYMES: ICD-10-CM

## 2024-12-02 DIAGNOSIS — R05.3 CHRONIC COUGH: ICD-10-CM

## 2024-12-02 DIAGNOSIS — Z09 FOLLOW-UP EXAM: Primary | ICD-10-CM

## 2024-12-02 PROCEDURE — 99214 OFFICE O/P EST MOD 30 MIN: CPT | Mod: ,,, | Performed by: INTERNAL MEDICINE

## 2024-12-02 RX ORDER — CETIRIZINE HYDROCHLORIDE, PSEUDOEPHEDRINE HYDROCHLORIDE 5; 120 MG/1; MG/1
1 TABLET, FILM COATED, EXTENDED RELEASE ORAL 2 TIMES DAILY
Qty: 20 TABLET | Refills: 0 | Status: SHIPPED | OUTPATIENT
Start: 2024-12-02 | End: 2024-12-12

## 2024-12-02 RX ORDER — LEVOTHYROXINE SODIUM 75 UG/1
75 TABLET ORAL
Qty: 90 TABLET | Refills: 1 | Status: SHIPPED | OUTPATIENT
Start: 2024-12-02 | End: 2025-12-02

## 2024-12-02 NOTE — PROGRESS NOTES
Subjective     Patient ID: Theresa Fam is a 79 y.o. female.    Chief Complaint: Follow-up (F/u /Questions about meds and vac)    The patient is a 79-year-old female the presents today for follow up.  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  She denies any significant changes in her health since we last saw her.  She was able to see Dr. Alex in Pulmonary.  She is doing okay but does still have a cough occasionally.  She had some lab work done recently and her free T4 was in normal limits but her TSH was elevated.  She is having some clinical signs and symptoms concerning for to little thyroid hormone as well.  She also had some elevated liver enzymes.  She is resting comfortably today in no distress.  She plans to get the pneumonia vaccine through her pharmacy.  She is afebrile and vital signs are stable.    Cough  Pertinent negatives include no chest pain, chills, ear pain, eye redness, fever, headaches, myalgias, rash, sore throat, shortness of breath or wheezing.   Follow-up  Associated symptoms include coughing. Pertinent negatives include no abdominal pain, arthralgias, chest pain, chills, congestion, fatigue, fever, headaches, joint swelling, myalgias, nausea, neck pain, rash, sore throat or weakness.     Review of Systems   Constitutional:  Negative for appetite change, chills, fatigue and fever.   HENT:  Negative for nasal congestion, ear pain, hearing loss, sinus pressure/congestion and sore throat.    Eyes:  Negative for pain, redness and visual disturbance.   Respiratory:  Positive for cough. Negative for apnea, shortness of breath and wheezing.    Cardiovascular:  Negative for chest pain and palpitations.   Gastrointestinal:  Negative for abdominal pain, constipation, diarrhea and nausea.   Endocrine: Negative for cold intolerance, heat intolerance and polyuria.   Genitourinary:  Negative for dysuria and hematuria.   Musculoskeletal:  Negative for  arthralgias, back pain, joint swelling, myalgias and neck pain.   Integumentary:  Negative for pallor, rash and wound.   Allergic/Immunologic: Negative for immunocompromised state.   Neurological:  Negative for tremors, seizures, weakness, headaches and memory loss.   Hematological:  Negative for adenopathy.   Psychiatric/Behavioral:  Negative for confusion, dysphoric mood and sleep disturbance. The patient is not nervous/anxious.           Objective     Physical Exam  Vitals and nursing note reviewed.   Constitutional:       General: She is not in acute distress.     Appearance: Normal appearance. She is not ill-appearing.   HENT:      Head: Normocephalic and atraumatic.      Right Ear: External ear normal.      Left Ear: External ear normal.      Nose: Nose normal.      Mouth/Throat:      Pharynx: Oropharynx is clear.   Eyes:      Extraocular Movements: Extraocular movements intact.      Conjunctiva/sclera: Conjunctivae normal.      Pupils: Pupils are equal, round, and reactive to light.   Cardiovascular:      Rate and Rhythm: Normal rate and regular rhythm.      Pulses: Normal pulses.      Heart sounds: Normal heart sounds. No murmur heard.  Pulmonary:      Effort: No respiratory distress.      Breath sounds: Normal breath sounds. No wheezing or rales.   Abdominal:      General: Bowel sounds are normal.      Palpations: Abdomen is soft.   Musculoskeletal:         General: Normal range of motion.      Cervical back: Normal range of motion and neck supple.      Right lower leg: No edema.      Left lower leg: No edema.   Skin:     General: Skin is warm and dry.      Capillary Refill: Capillary refill takes less than 2 seconds.      Coloration: Skin is not pale.   Neurological:      General: No focal deficit present.      Mental Status: She is alert and oriented to person, place, and time.      Cranial Nerves: No cranial nerve deficit.      Sensory: No sensory deficit.      Motor: No weakness.      Gait: Gait normal.    Psychiatric:         Mood and Affect: Mood normal.         Judgment: Judgment normal.            Assessment and Plan     1. Follow-up exam    2. Eosinophilic asthma    3. Hypothyroidism, unspecified type  -     TSH; Future; Expected date: 12/02/2024  -     T4, Free; Future; Expected date: 12/02/2024    4. Severe persistent asthma without complication    5. Chronic cough  -     cetirizine-pseudoephedrine 5-120 mg Tb12; Take 1 each by mouth 2 (two) times a day. for 10 days  Dispense: 20 tablet; Refill: 0    6. Gastroesophageal reflux disease, unspecified whether esophagitis present    7. Elevated liver enzymes  -     Comprehensive Metabolic Panel; Future; Expected date: 02/02/2025    Other orders  -     levothyroxine (SYNTHROID) 75 MCG tablet; Take 1 tablet (75 mcg total) by mouth before breakfast.  Dispense: 90 tablet; Refill: 1        1. The patient presents today for follow-up.  Up-to-date on age-appropriate health screenings.  We do need to see about setting her up for a bone density scan.  She plans to do pneumonia vaccine through her pharmacy.    2. GERD-could contribute some to this cough that she has.  She is on Protonix 40 mg daily.  Instructions are to take 30 minutes prior to 1st meal every day.    3. Hypothyroidism-she is on 50 mcg of Synthroid.  Her TSH recently was elevated.  Free T4 within normal limits.  However she does have some signs and symptoms concerning for to little thyroid hormone so we are going to increase her to 75 mcg daily.  We are going to repeat a TSH and a free T4 in 6-8 weeks.    4.  eosinophilic asthma-sounds like this diagnosis was made through [facility].  She has now been seen by Dr. Alex.  She is having some sinus drainage right now so we are going to treat her with Zyrtec D twice a day for 10 days and then she can go back to her Zyrtec.  She is also on Singulair, Trelegy, Astelin nasal spray and 5 mg of prednisone every other day    5. Elevated liver  enzymes-she is not a drinker.  Likely nonalcoholic fatty liver disease.  We are going to repeat CMP in 2 months.  If still elevated we will set her up for an ultrasound of the liver     Billing for this encounter is based on moderate level of medical decision-making    Follow up in about 6 months (around 6/2/2025).

## 2024-12-13 RX ORDER — FLUTICASONE PROPIONATE 50 MCG
1 SPRAY, SUSPENSION (ML) NASAL
COMMUNITY
Start: 2024-10-29

## 2024-12-15 NOTE — PROGRESS NOTES
[facility]  Pulmonology  ESTABLISHED VISIT     Patient Name:  Theresa Fam  Primary Care Provider: Ho Brady DO  Date of Service: 12/16/2024    Chief Complaint: Cough    SUBJECTIVE   HPI:  Theresa Fam is a 79 y.o. 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 Baumann), hypothyroidism, chronic urticaria and GERD who presents for follow up of asthma. Last seen 10/2024 with continuation of asthma therapy with addition of azelastine.    Sarwat reports improvement in her breathing and her cough.  She is continuing on her therapy and is on her last sample of Trelegy inhaler.  She has sent in her paperwork for the pharmacy referral in his awaiting to hear back from them.  She raises concerns of a rash in her lower leg that was itching, however, after a cream is better. Earlier this month, she was treated with a 10 day course of Zyrtec D for increased PND symptoms.     Initial HPI  Sarwat reports worsening of her cough symptoms in the past 3 months. Her symptoms initially consistent of coughing paroxysms with associated shortness of breath. This evolved to having coughing episodes with no sleep interruption. She has a sensation of drainage at the back of her throat. She also has chest congestion. Her coughing episodes now with goal to relieve phlegm with expectorant a small volume green colored sputum. Her symptoms have progressed in severity requiring presentation to the ED 3 times since the start of this year. Outside records have been reviewed in Care Everywhere. She has improvement of symptoms while on steroid therapy as well as antibiotics and antitussives. She has history of CRSsNP. She follows with [facility] where she is treated with Xolair injections since 2022; last Feb 12th 2024. She uses Trelegy daily now and has just received albuterol nebuilizer mediation and equipment. Her home in the past 3 weeks has undergone maintenance of the HVAC system and cleaning of her  carpeted rooms.  03/2024: reports having an acute episode overnight with coughing paroxysms and breathlessness. She had to use her nebulizer twice. Her symptoms were worrisome enough for her  to consider bringing her to the ED. She had improvement with breathing treatments. She remains short of breath today.   Review of OSH records notable for her undergoing bronchoscopy 01/05/2024 with culture growing Haemophilus parainfluenza.   05/2024: Sarwat has had an exacerbation in the past month characterized by increase in her coughing episodes and requiring a course of steroids (including dexamethasone) and antibiotics. She was started on Tezspire and has received her 2nd dose.  07/2024: reports episode of coughing that was associated with shortness of breath.  Cough was nonproductive.  Symptoms have improved today when compared to prior.  She was run out of samples of Trelegy and has missed doses of her daily regimen.  We discussed at length her asthma diagnosis and expected symptoms with exacerbations.  She continues on prednisone 10 mg daily.  10/2024: reports feeling improved on this assessment.  She has had increase in her coughing episode.  She states that she was increased drainage which leaves a sensation of chest congestion. She recently presented to outpatient clinic with 1 week hx of cough and wheezing which was managed with IM steroid.  She feels that this helped with her improvement.  She is currently taking Trelegy daily, albuterol nebulizers and MDI.  With improvement of her nasal congestion, she stopped use of her intranasal Flonase.  She is currently taking prednisone 5 mg daily.    Past Medical History:   Diagnosis Date    Asthma     Thyroid disease        Past Surgical History:   Procedure Laterality Date    BRONCHOSCOPY  01/2024    CHOLECYSTECTOMY      EYE SURGERY Bilateral     cataract removal       Family History   Problem Relation Name Age of Onset    Breast cancer Maternal Aunt      Breast  "cancer Maternal Aunt      Breast cancer Maternal Aunt      Breast cancer Maternal Cousin      Breast cancer Maternal Cousin      Breast cancer Maternal Cousin          Social History     Socioeconomic History    Marital status:    Tobacco Use    Smoking status: Never     Passive exposure: Never    Smokeless tobacco: Never   Substance and Sexual Activity    Alcohol use: Never    Drug use: Never    Sexual activity: Yes     Partners: Male       Social History     Social History Narrative    Not on file       Review of patient's allergies indicates:   Allergen Reactions    Amoxil [amoxicillin]     Rocephin [ceftriaxone]         Medications: Medications reviewed to include over the counter medications.    Review of Systems: A focused ROS was completed and found to be negative except for that mentioned above.      OBJECTIVE   PHYSICAL EXAM:  Vitals:    12/16/24 1304   BP: 122/82   BP Location: Left arm   Patient Position: Sitting   Pulse: 85   Resp: 18   SpO2: 95%   Weight: 75.3 kg (166 lb)   Height: 5' 5" (1.651 m)     GENERAL: NAD  HEENT: normocephalic, non-icteric conjunctivae, moist oral mucosa  RESPIRATORY: normal pulmonary effort, on RA  SKIN: LLE distal with intact skin, improving vague erythema, no purulence, pustules or drainage, skin is intact  MUSCULOSKELETAL: No clubbing or cyanosis; no LE edema  NEUROLOGIC: AO ×3, no gross deficits    LABS:  Lab studies reviewed and notable for IgE 432, SEos 580, Ig Quant panel (IgG,A,M) wnl  OSH CBC 02/2024:  H/H 14.3/44.2, WBC 7.56, serum eosinophils 1000 (13.4%); , , T bili 0.40, , creatinine 1.10, BNP 14.3    BAL DIFF 05/2024:   Latest Reference Range & Units 05/17/24 08:32   Color, Body Fluid  Colorless   Clarity, Body Fluid Clear  Clear   RBC, Body Fluid /cumm <3,000   Cell Count Excluding RBCs <=150 /cumm 74   Polys, Fluid Man % 0 - 25 % 31 (H)   Lymphs, Fluid % 29   Macrophages, Fluid Man % % 13   Monocytes, Fluid Man % % 20   Eos, Fluid % " 7     IMAGING:  Imaging reviewed and notable for CT Chest 03/2024 with lingular scarring over fissure with mild cylindical bronchiectasis, no masses,  no emphysema, no pleural effusion       CXR 01/2024 OSH:  FINDINGS:The cardiovascular and mediastinal contours unremarkable.     OSH CXR 02/2024: Heart size is within normal limits.  Chronic change of lung with a focal consolidation, pleural effusion or pneumothorax.  Visualized osseous with surrounding soft tissue structures appear grossly unchanged.    LUNG FUNCTION TESTING:    SPIROMETRY/PFT:  03/2024 Pre Post   FVC 1.84/-1.86 2.27/-0.94 +BD   FEV1 1.01/-2.84 1.46/-1.69 +BD   FEV1/FVC 55/-2.44 64/-1.52   TLC 3.50/-2.59    FRC  2.05/-1.47    RV 1.34/-2.17    DLCO 13.11/-2.31    NOTE: PFT while in acute exacerbation      ASSESSMENT & PLAN     1. Eosinophilic asthma  Assessment & Plan:  80 yo F with poorly controlled eosinophilic asthma, chronic rhinitis and urticaria (previously on Xolair w/ exacerbations) presents for follow up evaluation with poorly controlled symptoms now on prednisone 10 mg Qday. Her regimen includes Tezspire SQ, Trelegy, montelukast, cetirizine and KVNG PRN. Prior evaluation has included lab work notable for IgE 432, SEos 580; pertinent neg include Aspergillus. Exacerbations have included infectious triggers (Bronch with BAL at OSH 01/2024 with Cx growing Haemophilus parainfluenza), environmental and undermedication. PFT with impressive BD response. CT Chest 03/2024 with chronic appearing lingula scar with no other overt evidence of airway or parenchymal disease. Now s/p bronchoscopy w/ BAL notable for 7% Eos consistent with bronchial asthma with ongoing inflammatory response (PMN predominant) and negative cultures; bronchoscopy was significant for bronchospasm as well. Constellation of symptoms is consistent with poorly controlled eosinophilic asthma that is now steroid dependent. Plan for management as follows:  - cont Trelegy 100 Qday with  KVNG PRN neb vs MDI   -- sample x2 provided  - cont azelastine twice daily and resume Flonase twice daily x1 week and then daily thereafter  - cont montelukast QHS  - cont flonase Qday and cetirizine Qday  - cont Prednisone 5 mg Qday  - cont Tezspire, managed with Dr. Baumann at [facility]      2. Skin rash  Assessment & Plan:  Does not appear to be urticaria. Would favor resolving skin irritation. No concern for infection. Continue emollients           No follow-ups on file.       Case was discussed with patient; all questions were answered to patient's satisfaction and patient verbalized understanding.     Angela Alex MD  Pulmonary Medicine  [facility]  Phone: [phone]

## 2024-12-16 ENCOUNTER — OFFICE VISIT (OUTPATIENT)
Dept: PULMONOLOGY | Facility: CLINIC | Age: 79
End: 2024-12-16
Payer: MEDICARE

## 2024-12-16 VITALS
WEIGHT: 166 LBS | OXYGEN SATURATION: 95 % | RESPIRATION RATE: 18 BRPM | DIASTOLIC BLOOD PRESSURE: 82 MMHG | BODY MASS INDEX: 27.66 KG/M2 | HEIGHT: 65 IN | SYSTOLIC BLOOD PRESSURE: 122 MMHG | HEART RATE: 85 BPM

## 2024-12-16 DIAGNOSIS — R21 SKIN RASH: ICD-10-CM

## 2024-12-16 DIAGNOSIS — J82.83 EOSINOPHILIC ASTHMA: Primary | ICD-10-CM

## 2024-12-16 PROCEDURE — 99999 PR PBB SHADOW E&M-EST. PATIENT-LVL IV: CPT | Mod: PBBFAC,,, | Performed by: STUDENT IN AN ORGANIZED HEALTH CARE EDUCATION/TRAINING PROGRAM

## 2024-12-16 PROCEDURE — 99214 OFFICE O/P EST MOD 30 MIN: CPT | Mod: PBBFAC | Performed by: STUDENT IN AN ORGANIZED HEALTH CARE EDUCATION/TRAINING PROGRAM

## 2024-12-16 PROCEDURE — 99214 OFFICE O/P EST MOD 30 MIN: CPT | Mod: S$PBB,,, | Performed by: STUDENT IN AN ORGANIZED HEALTH CARE EDUCATION/TRAINING PROGRAM

## 2024-12-16 RX ORDER — TRIAMCINOLONE ACETONIDE 1 MG/G
OINTMENT TOPICAL 2 TIMES DAILY
COMMUNITY
Start: 2024-12-10

## 2024-12-16 NOTE — ASSESSMENT & PLAN NOTE
78 yo F with poorly controlled eosinophilic asthma, chronic rhinitis and urticaria (previously on Xolair w/ exacerbations) presents for follow up evaluation with poorly controlled symptoms now on prednisone 10 mg Qday. Her regimen includes Tezspire SQ, Trelegy, montelukast, cetirizine and KVNG PRN. Prior evaluation has included lab work notable for IgE 432, SEos 580; pertinent neg include Aspergillus. Exacerbations have included infectious triggers (Bronch with BAL at OSH 01/2024 with Cx growing Haemophilus parainfluenza), environmental and undermedication. PFT with impressive BD response. CT Chest 03/2024 with chronic appearing lingula scar with no other overt evidence of airway or parenchymal disease. Now s/p bronchoscopy w/ BAL notable for 7% Eos consistent with bronchial asthma with ongoing inflammatory response (PMN predominant) and negative cultures; bronchoscopy was significant for bronchospasm as well. Constellation of symptoms is consistent with poorly controlled eosinophilic asthma that is now steroid dependent. Plan for management as follows:  - cont Trelegy 100 Qday with KVNG PRN neb vs MDI   -- sample x2 provided  - cont azelastine twice daily and resume Flonase twice daily x1 week and then daily thereafter  - cont montelukast QHS  - cont flonase Qday and cetirizine Qday  - cont Prednisone 5 mg Qday  - cont Tezspire, managed with Dr. Baumann at [facility]

## 2024-12-16 NOTE — ASSESSMENT & PLAN NOTE
Does not appear to be urticaria. Would favor resolving skin irritation. No concern for infection. Continue emollients

## 2025-02-11 ENCOUNTER — TELEPHONE (OUTPATIENT)
Dept: FAMILY MEDICINE | Facility: CLINIC | Age: 80
End: 2025-02-11
Payer: MEDICARE

## 2025-02-11 NOTE — TELEPHONE ENCOUNTER
----- Message from Juliane sent at 2/10/2025  4:48 PM CST -----  Regarding: NEED LABS  NEED BLODD WORK ADDED FOR APPOINTMENT ANY QUESTIONS CALL [phone]

## 2025-02-20 ENCOUNTER — RESULTS FOLLOW-UP (OUTPATIENT)
Dept: INTERNAL MEDICINE | Facility: CLINIC | Age: 80
End: 2025-02-20
Payer: MEDICARE

## 2025-02-28 DIAGNOSIS — R74.8 ELEVATED LIVER ENZYMES: Primary | ICD-10-CM

## 2025-03-04 ENCOUNTER — OFFICE VISIT (OUTPATIENT)
Dept: FAMILY MEDICINE | Facility: CLINIC | Age: 80
End: 2025-03-04
Payer: MEDICARE

## 2025-03-04 VITALS
TEMPERATURE: 98 F | RESPIRATION RATE: 19 BRPM | HEART RATE: 83 BPM | SYSTOLIC BLOOD PRESSURE: 126 MMHG | BODY MASS INDEX: 29.32 KG/M2 | DIASTOLIC BLOOD PRESSURE: 84 MMHG | WEIGHT: 176 LBS | OXYGEN SATURATION: 96 % | HEIGHT: 65 IN

## 2025-03-04 DIAGNOSIS — K21.9 GASTROESOPHAGEAL REFLUX DISEASE WITHOUT ESOPHAGITIS: ICD-10-CM

## 2025-03-04 DIAGNOSIS — J01.90 ACUTE NON-RECURRENT SINUSITIS, UNSPECIFIED LOCATION: Primary | ICD-10-CM

## 2025-03-04 DIAGNOSIS — R09.81 NASAL CONGESTION: ICD-10-CM

## 2025-03-04 DIAGNOSIS — R05.9 COUGH, UNSPECIFIED TYPE: ICD-10-CM

## 2025-03-04 LAB
CTP QC/QA: YES
CTP QC/QA: YES
POC MOLECULAR INFLUENZA A AGN: NEGATIVE
POC MOLECULAR INFLUENZA B AGN: NEGATIVE
SARS-COV-2 RDRP RESP QL NAA+PROBE: NEGATIVE

## 2025-03-04 PROCEDURE — 87635 SARS-COV-2 COVID-19 AMP PRB: CPT | Mod: RHCUB | Performed by: NURSE PRACTITIONER

## 2025-03-04 PROCEDURE — 87502 INFLUENZA DNA AMP PROBE: CPT | Mod: RHCUB | Performed by: NURSE PRACTITIONER

## 2025-03-04 PROCEDURE — 99213 OFFICE O/P EST LOW 20 MIN: CPT | Mod: ,,, | Performed by: NURSE PRACTITIONER

## 2025-03-04 RX ORDER — AZITHROMYCIN 250 MG/1
TABLET, FILM COATED ORAL
Qty: 6 TABLET | Refills: 0 | Status: SHIPPED | OUTPATIENT
Start: 2025-03-04 | End: 2025-03-09

## 2025-03-04 RX ORDER — PANTOPRAZOLE SODIUM 40 MG/1
40 TABLET, DELAYED RELEASE ORAL DAILY
Qty: 90 TABLET | Refills: 0 | Status: SHIPPED | OUTPATIENT
Start: 2025-03-04

## 2025-03-04 NOTE — PROGRESS NOTES
Subjective:       Patient ID: Theresa Fam is a 79 y.o. female.    Chief Complaint: Nasal Congestion (Pt states she feels pressure around her eyes. S/S started 3/2) and Cough    Nasal congestion, sinus pressure and cough    Cough  Pertinent negatives include no chest pain, ear pain, fever, headaches, rash, sore throat or shortness of breath.     Review of Systems   Constitutional:  Negative for appetite change, fatigue and fever.   HENT:  Positive for nasal congestion and sinus pressure/congestion. Negative for ear pain and sore throat.    Eyes:  Negative for pain, discharge and itching.   Respiratory:  Positive for cough. Negative for shortness of breath.    Cardiovascular:  Negative for chest pain and leg swelling.   Gastrointestinal:  Negative for abdominal pain, change in bowel habit, nausea and vomiting.   Musculoskeletal:  Negative for back pain, gait problem and neck pain.   Integumentary:  Negative for rash and wound.   Allergic/Immunologic: Negative for immunocompromised state.   Neurological:  Negative for dizziness, weakness and headaches.   All other systems reviewed and are negative.        Objective:      Physical Exam  Vitals and nursing note reviewed.   Constitutional:       General: She is not in acute distress.     Appearance: Normal appearance. She is not ill-appearing, toxic-appearing or diaphoretic.   HENT:      Head: Normocephalic.      Right Ear: Tympanic membrane, ear canal and external ear normal.      Left Ear: Tympanic membrane, ear canal and external ear normal.      Nose: Mucosal edema and congestion present. No rhinorrhea.      Right Turbinates: Swollen.      Left Turbinates: Swollen.      Mouth/Throat:      Mouth: Mucous membranes are moist.      Pharynx: No oropharyngeal exudate or posterior oropharyngeal erythema.   Eyes:      General: No scleral icterus.        Right eye: No discharge.         Left eye: No discharge.      Extraocular Movements: Extraocular movements intact.       Conjunctiva/sclera: Conjunctivae normal.      Pupils: Pupils are equal, round, and reactive to light.   Cardiovascular:      Rate and Rhythm: Normal rate and regular rhythm.      Pulses: Normal pulses.      Heart sounds: Normal heart sounds. No murmur heard.  Pulmonary:      Effort: Pulmonary effort is normal. No respiratory distress.      Breath sounds: Normal breath sounds. No wheezing, rhonchi or rales.   Musculoskeletal:         General: Normal range of motion.      Cervical back: Neck supple. No tenderness.   Lymphadenopathy:      Cervical: No cervical adenopathy.   Skin:     General: Skin is warm and dry.      Capillary Refill: Capillary refill takes less than 2 seconds.      Findings: No rash.   Neurological:      Mental Status: She is alert and oriented to person, place, and time.   Psychiatric:         Mood and Affect: Mood normal.         Behavior: Behavior normal.         Thought Content: Thought content normal.         Judgment: Judgment normal.            Assessment:       1. Acute non-recurrent sinusitis, unspecified location    2. Nasal congestion    3. Cough, unspecified type    4. Gastroesophageal reflux disease without esophagitis        Plan:   Acute non-recurrent sinusitis, unspecified location  -     azithromycin (Z-LOLY) 250 MG tablet; Take 2 tablets by mouth on day 1; Take 1 tablet by mouth on days 2-5  Dispense: 6 tablet; Refill: 0    Nasal congestion  -     POCT COVID-19 Rapid Screening  -     POCT Influenza A/B Molecular    Cough, unspecified type  -     POCT COVID-19 Rapid Screening  -     POCT Influenza A/B Molecular    Gastroesophageal reflux disease without esophagitis  -     pantoprazole (PROTONIX) 40 MG tablet; Take 1 tablet (40 mg total) by mouth once daily.  Dispense: 90 tablet; Refill: 0           Risks, benefits, and side effects were discussed with the patient. All questions were answered to the fullest satisfaction of the patient, and pt verbalized understanding and agreement to  treatment plan. Pt was to call with any new or worsening symptoms, or present to the ER

## 2025-03-10 ENCOUNTER — HOSPITAL ENCOUNTER (OUTPATIENT)
Dept: RADIOLOGY | Facility: HOSPITAL | Age: 80
Discharge: HOME | End: 2025-03-10
Attending: INTERNAL MEDICINE
Payer: MEDICARE

## 2025-03-10 DIAGNOSIS — R74.8 ELEVATED LIVER ENZYMES: ICD-10-CM

## 2025-03-10 PROCEDURE — 76700 US EXAM ABDOM COMPLETE: CPT | Mod: TC

## 2025-03-10 PROCEDURE — 76700 US EXAM ABDOM COMPLETE: CPT | Mod: 26,,, | Performed by: RADIOLOGY

## 2025-03-12 ENCOUNTER — RESULTS FOLLOW-UP (OUTPATIENT)
Dept: INTERNAL MEDICINE | Facility: CLINIC | Age: 80
End: 2025-03-12

## 2025-04-01 ENCOUNTER — OFFICE VISIT (OUTPATIENT)
Dept: PULMONOLOGY | Facility: CLINIC | Age: 80
End: 2025-04-01
Payer: MEDICARE

## 2025-04-01 VITALS
HEIGHT: 65 IN | DIASTOLIC BLOOD PRESSURE: 82 MMHG | BODY MASS INDEX: 29.31 KG/M2 | HEART RATE: 78 BPM | RESPIRATION RATE: 16 BRPM | SYSTOLIC BLOOD PRESSURE: 128 MMHG | WEIGHT: 175.94 LBS | OXYGEN SATURATION: 96 %

## 2025-04-01 DIAGNOSIS — J82.83 EOSINOPHILIC ASTHMA: ICD-10-CM

## 2025-04-01 DIAGNOSIS — J45.50 SEVERE PERSISTENT ASTHMA WITHOUT COMPLICATION: ICD-10-CM

## 2025-04-01 DIAGNOSIS — K21.9 GASTROESOPHAGEAL REFLUX DISEASE WITHOUT ESOPHAGITIS: ICD-10-CM

## 2025-04-01 DIAGNOSIS — L50.8 CHRONIC URTICARIA: Primary | ICD-10-CM

## 2025-04-01 PROCEDURE — 99214 OFFICE O/P EST MOD 30 MIN: CPT | Mod: S$PBB,,, | Performed by: STUDENT IN AN ORGANIZED HEALTH CARE EDUCATION/TRAINING PROGRAM

## 2025-04-01 PROCEDURE — 99214 OFFICE O/P EST MOD 30 MIN: CPT | Mod: PBBFAC | Performed by: STUDENT IN AN ORGANIZED HEALTH CARE EDUCATION/TRAINING PROGRAM

## 2025-04-01 PROCEDURE — 99999 PR PBB SHADOW E&M-EST. PATIENT-LVL IV: CPT | Mod: PBBFAC,,, | Performed by: STUDENT IN AN ORGANIZED HEALTH CARE EDUCATION/TRAINING PROGRAM

## 2025-04-01 RX ORDER — IPRATROPIUM BROMIDE 42 UG/1
SPRAY, METERED NASAL
COMMUNITY
Start: 2025-03-25

## 2025-04-01 RX ORDER — FAMOTIDINE 20 MG/1
20 TABLET, FILM COATED ORAL 2 TIMES DAILY
Qty: 60 TABLET | Refills: 2 | Status: SHIPPED | OUTPATIENT
Start: 2025-04-01 | End: 2025-04-01

## 2025-04-01 RX ORDER — PANTOPRAZOLE SODIUM 40 MG/1
40 TABLET, DELAYED RELEASE ORAL DAILY
Qty: 90 TABLET | Refills: 0 | Status: SHIPPED | OUTPATIENT
Start: 2025-05-01

## 2025-04-01 RX ORDER — MONTELUKAST SODIUM 10 MG/1
10 TABLET ORAL NIGHTLY
Qty: 30 TABLET | Refills: 11 | Status: SHIPPED | OUTPATIENT
Start: 2025-04-01 | End: 2026-03-27

## 2025-04-01 RX ORDER — FAMOTIDINE 20 MG/1
20 TABLET, FILM COATED ORAL DAILY
Qty: 30 TABLET | Refills: 1 | Status: SHIPPED | OUTPATIENT
Start: 2025-04-01 | End: 2025-05-31

## 2025-04-01 RX ORDER — TEZEPELUMAB-EKKO 210 MG/1.9ML
INJECTION, SOLUTION SUBCUTANEOUS
COMMUNITY

## 2025-04-01 NOTE — ASSESSMENT & PLAN NOTE
78 yo F with poorly controlled eosinophilic asthma, chronic rhinitis and urticaria (previously on Xolair w/ exacerbations) presents for follow up evaluation with poorly controlled symptoms now on prednisone 10 mg Qday. Her regimen includes Tezspire SQ, Trelegy, montelukast, cetirizine and KVNG PRN. Prior evaluation has included lab work notable for IgE 432, SEos 580; pertinent neg include Aspergillus. Exacerbations have included infectious triggers (Bronch with BAL at OSH 01/2024 with Cx growing Haemophilus parainfluenza), environmental and undermedication. PFT with impressive BD response. CT Chest 03/2024 with chronic appearing lingula scar with no other overt evidence of airway or parenchymal disease. Now s/p bronchoscopy w/ BAL notable for 7% Eos consistent with bronchial asthma with ongoing inflammatory response (PMN predominant) and negative cultures; bronchoscopy was significant for bronchospasm as well. Constellation of symptoms is consistent with poorly controlled eosinophilic asthma that is now steroid dependent. Plan for management as follows:  - symptoms are well controlled at present  - cont Trelegy 100 Qday with KVNG PRN neb vs MDI   -- sample x2 provided  - cont azelastine twice daily and resume Flonase twice daily x1 week and then daily thereafter  - cont montelukast QHS, refill provided  - cont flonase Qday and cetirizine Qday  - cont Prednisone 5 mg Qday, reprots adequate Rx at home  - cont Tezspire, managed with Dr. Baumann at [facility]

## 2025-04-01 NOTE — ASSESSMENT & PLAN NOTE
Was previously controlled on Xolair, now s/p transition of treatment. Will treat with a 4 week course of famotidine Qday (hold Protonix) and will schedule f/u with Dermatology for assessment. She is established with [facility] and is planned for work up as well for Ig levels.

## 2025-04-01 NOTE — PROGRESS NOTES
[facility]  Pulmonology  ESTABLISHED VISIT     Patient Name:  Theresa Fam  Primary Care Provider: Ho Brady DO  Date of Service: 04/01/2025    Chief Complaint: Cough    SUBJECTIVE   HPI:  Theresa Fam is a 79 y.o. 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 Baumann), hypothyroidism, chronic urticaria and GERD who presents for follow up of asthma. Last seen 12/2024 with continuation of asthma therapy.    Sarwat reports feeling well on this assessment. She had increased shortness of breath in February that has since improved. She is using rescue inhaler every am. She overall feels improved. She details ongoing work up with Immunology for response to pneumonia vaccine (Ig w/u). She has a persistent rash in her forearms. No hospitalizations since last seen.    Initial HPI  Sarwat reports worsening of her cough symptoms in the past 3 months. Her symptoms initially consistent of coughing paroxysms with associated shortness of breath. This evolved to having coughing episodes with no sleep interruption. She has a sensation of drainage at the back of her throat. She also has chest congestion. Her coughing episodes now with goal to relieve phlegm with expectorant a small volume green colored sputum. Her symptoms have progressed in severity requiring presentation to the ED 3 times since the start of this year. Outside records have been reviewed in Care Everywhere. She has improvement of symptoms while on steroid therapy as well as antibiotics and antitussives. She has history of CRSsNP. She follows with [facility] where she is treated with Xolair injections since 2022; last Feb 12th 2024. She uses Trelegy daily now and has just received albuterol nebuilizer mediation and equipment. Her home in the past 3 weeks has undergone maintenance of the HVAC system and cleaning of her carpeted rooms.  03/2024: reports having an acute episode overnight with coughing paroxysms and  breathlessness. She had to use her nebulizer twice. Her symptoms were worrisome enough for her  to consider bringing her to the ED. She had improvement with breathing treatments. She remains short of breath today.   Review of OSH records notable for her undergoing bronchoscopy 01/05/2024 with culture growing Haemophilus parainfluenza.   05/2024: Sarwat has had an exacerbation in the past month characterized by increase in her coughing episodes and requiring a course of steroids (including dexamethasone) and antibiotics. She was started on Tezspire and has received her 2nd dose.  07/2024: reports episode of coughing that was associated with shortness of breath.  Cough was nonproductive.  Symptoms have improved today when compared to prior.  She was run out of samples of Trelegy and has missed doses of her daily regimen.  We discussed at length her asthma diagnosis and expected symptoms with exacerbations.  She continues on prednisone 10 mg daily.  10/2024: reports feeling improved on this assessment.  She has had increase in her coughing episode.  She states that she was increased drainage which leaves a sensation of chest congestion. She recently presented to outpatient clinic with 1 week hx of cough and wheezing which was managed with IM steroid.  She feels that this helped with her improvement.  She is currently taking Trelegy daily, albuterol nebulizers and MDI.  With improvement of her nasal congestion, she stopped use of her intranasal Flonase.  She is currently taking prednisone 5 mg daily.  12/2024: reports improvement in her breathing and her cough.  She is continuing on her therapy and is on her last sample of Trelegy inhaler.  She has sent in her paperwork for the pharmacy referral in his awaiting to hear back from them.  She raises concerns of a rash in her lower leg that was itching, however, after a cream is better. Earlier this month, she was treated with a 10 day course of Zyrtec D for increased  "PND symptoms.     Past Medical History:   Diagnosis Date    Asthma     Thyroid disease        Past Surgical History:   Procedure Laterality Date    BRONCHOSCOPY  01/2024    CHOLECYSTECTOMY      EYE SURGERY Bilateral     cataract removal       Family History   Problem Relation Name Age of Onset    Breast cancer Maternal Aunt      Breast cancer Maternal Aunt      Breast cancer Maternal Aunt      Breast cancer Maternal Cousin      Breast cancer Maternal Cousin      Breast cancer Maternal Cousin          Social History     Socioeconomic History    Marital status:    Tobacco Use    Smoking status: Never     Passive exposure: Never    Smokeless tobacco: Never   Substance and Sexual Activity    Alcohol use: Never    Drug use: Never    Sexual activity: Yes     Partners: Male       Social History     Social History Narrative    Not on file       Review of patient's allergies indicates:   Allergen Reactions    Amoxil [amoxicillin]     Rocephin [ceftriaxone]         Medications: Medications reviewed to include over the counter medications.    Review of Systems: A focused ROS was completed and found to be negative except for that mentioned above.      OBJECTIVE   PHYSICAL EXAM:  Vitals:    04/01/25 1339   BP: 128/82   BP Location: Left arm   Patient Position: Sitting   Pulse: 78   Resp: 16   SpO2: 96%   Weight: 79.8 kg (175 lb 14.8 oz)   Height: 5' 5" (1.651 m)     GENERAL: NAD  HEENT: normocephalic, non-icteric conjunctivae, moist oral mucosa  RESPIRATORY: normal pulmonary effort, on RA  SKIN: LLE distal with intact skin, improving vague erythema, no purulence, pustules or drainage, skin is intact  MUSCULOSKELETAL: No clubbing or cyanosis; no LE edema  NEUROLOGIC: AO ×3, no gross deficits    LABS:  Lab studies reviewed and notable for IgE 432, SEos 580, Ig Quant panel (IgG,A,M) wnl  OS CBC 02/2024:  H/H 14.3/44.2, WBC 7.56, serum eosinophils 1000 (13.4%); , , T bili 0.40, , creatinine 1.10, BNP " 14.3    BAL DIFF 05/2024:   Latest Reference Range & Units 05/17/24 08:32   Color, Body Fluid  Colorless   Clarity, Body Fluid Clear  Clear   RBC, Body Fluid /cumm <3,000   Cell Count Excluding RBCs <=150 /cumm 74   Polys, Fluid Man % 0 - 25 % 31 (H)   Lymphs, Fluid % 29   Macrophages, Fluid Man % % 13   Monocytes, Fluid Man % % 20   Eos, Fluid % 7     IMAGING:  Imaging reviewed and notable for CT Chest 03/2024 with lingular scarring over fissure with mild cylindical bronchiectasis, no masses,  no emphysema, no pleural effusion       CXR 01/2024 OSH:  FINDINGS:The cardiovascular and mediastinal contours unremarkable.     OSH CXR 02/2024: Heart size is within normal limits.  Chronic change of lung with a focal consolidation, pleural effusion or pneumothorax.  Visualized osseous with surrounding soft tissue structures appear grossly unchanged.    LUNG FUNCTION TESTING:    SPIROMETRY/PFT:  03/2024 Pre Post   FVC 1.84/-1.86 2.27/-0.94 +BD   FEV1 1.01/-2.84 1.46/-1.69 +BD   FEV1/FVC 55/-2.44 64/-1.52   TLC 3.50/-2.59    FRC  2.05/-1.47    RV 1.34/-2.17    DLCO 13.11/-2.31    NOTE: PFT while in acute exacerbation      ASSESSMENT & PLAN     1. Chronic urticaria  Assessment & Plan:  Was previously controlled on Xolair, now s/p transition of treatment. Will treat with a 4 week course of famotidine Qday (hold Protonix) and will schedule f/u with Dermatology for assessment. She is established with [facility] and is planned for work up as well for Ig levels.     Orders:  -     Discontinue: famotidine (PEPCID) 20 MG tablet; Take 1 tablet (20 mg total) by mouth 2 (two) times daily.  Dispense: 60 tablet; Refill: 2  -     famotidine (PEPCID) 20 MG tablet; Take 1 tablet (20 mg total) by mouth Daily.  Dispense: 30 tablet; Refill: 1    2. Severe persistent asthma without complication  Assessment & Plan:  Management as per eosinophilic asthma plan    Orders:  -     montelukast (SINGULAIR) 10 mg tablet; Take 1 tablet (10 mg total)  by mouth every evening.  Dispense: 30 tablet; Refill: 11    3. Gastroesophageal reflux disease without esophagitis  -     pantoprazole (PROTONIX) 40 MG tablet; Take 1 tablet (40 mg total) by mouth once daily.  Dispense: 90 tablet; Refill: 0    4. Eosinophilic asthma  Assessment & Plan:  78 yo F with poorly controlled eosinophilic asthma, chronic rhinitis and urticaria (previously on Xolair w/ exacerbations) presents for follow up evaluation with poorly controlled symptoms now on prednisone 10 mg Qday. Her regimen includes Tezspire SQ, Trelegy, montelukast, cetirizine and KVNG PRN. Prior evaluation has included lab work notable for IgE 432, SEos 580; pertinent neg include Aspergillus. Exacerbations have included infectious triggers (Bronch with BAL at OSH 01/2024 with Cx growing Haemophilus parainfluenza), environmental and undermedication. PFT with impressive BD response. CT Chest 03/2024 with chronic appearing lingula scar with no other overt evidence of airway or parenchymal disease. Now s/p bronchoscopy w/ BAL notable for 7% Eos consistent with bronchial asthma with ongoing inflammatory response (PMN predominant) and negative cultures; bronchoscopy was significant for bronchospasm as well. Constellation of symptoms is consistent with poorly controlled eosinophilic asthma that is now steroid dependent. Plan for management as follows:  - symptoms are well controlled at present  - cont Trelegy 100 Qday with KVNG PRN neb vs MDI   -- sample x2 provided  - cont azelastine twice daily and resume Flonase twice daily x1 week and then daily thereafter  - cont montelukast QHS, refill provided  - cont flonase Qday and cetirizine Qday  - cont Prednisone 5 mg Qday, reprots adequate Rx at home  - cont Tezspire, managed with Dr. Baumann at [facility]            Follow up in about 6 months (around 10/1/2025) for Routine follow up.       Case was discussed with patient; all questions were answered to patient's satisfaction and  patient verbalized understanding.     Angela Alex MD  Pulmonary Medicine  [facility]  Phone: [phone]

## 2025-04-09 ENCOUNTER — OFFICE VISIT (OUTPATIENT)
Dept: DERMATOLOGY | Facility: CLINIC | Age: 80
End: 2025-04-09
Payer: MEDICARE

## 2025-04-09 DIAGNOSIS — L30.9 DERMATITIS, UNSPECIFIED: Primary | ICD-10-CM

## 2025-04-09 RX ORDER — TRIAMCINOLONE ACETONIDE 1 MG/G
OINTMENT TOPICAL 2 TIMES DAILY
Qty: 80 G | Refills: 5 | Status: CANCELLED | OUTPATIENT
Start: 2025-04-09

## 2025-04-09 NOTE — PROGRESS NOTES
[facility]  Ryne Corea MD    [address] [place] [address]  [phone]    Fax: [phone]    Patient Name: Theresa Fam  Medical Record Number: [id number]  PCP: Ho Brady DO  Age: 79 y.o. : 1945  Contact: There are no phone numbers on file.    History of Present Illness:     Theresa Fam is a 79 y.o.  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 Patient is concerned with rash on the L arm and right leg. It has been present 3 weeks. Symptoms include pruritus. Previous treatments include Sarna lotion and hydrocortisone cream. Of note, she has a history of chronic urticaria and eosinophilic esophagitis. She follows with Dr. Alex and Dr Baumann. She was previously on xolair but was recently switched to Tezspire.     The patient has no other concerns today.    Review of Systems:     Unremarkable other than mentioned above.     Physical Exam:     General: Relaxed, oriented, alert    Skin examination of the scalp, face, neck, chest, back, abdomen, upper extremities and lower extremities were normal except for as listed below      Assessment and Plan:     1. Dermatitis Unspecified  - eczematous patches on L forearm and R shin   DDx: nummular eczema vs AD vs contact dermatitis vs drug eruption. Does not appear urticarial today     Plan:  - will send TAC 0.1% ointment and reassess in 3-4 weeks. Discussed atopic precautions       Counseling  I counseled the patient regarding the following:  Skin care: Patient instructed to use gentle skin care including dove unscented soap, CeraVe moisturizing cream, and fragrance free laundry detergent.  Expectations: The patient understands that there is not a definitive diagnosis at this time. Further testing or empiric therapy may be necessary to diagnose and improve the condition.  Contact office if: The patient develops a fever, or rash dramatically worsens despite  treatment.      Ryne Corea MD   Mohs Surgery/Dermatologic Oncology  Dermatology

## 2025-04-11 ENCOUNTER — TELEPHONE (OUTPATIENT)
Dept: PULMONOLOGY | Facility: CLINIC | Age: 80
End: 2025-04-11
Payer: MEDICARE

## 2025-04-11 NOTE — TELEPHONE ENCOUNTER
Cough ongoing x1 week, worsening symptoms along with some SOB. Per Dr Pascual last night, she was supposed to be continuing Trelegy, nasal sprays, singulair, and prednisone 5mg. Patient was not taking the prednisone so she said that she will start that today to see if it helps with her symptoms. Scheduled follow up with Dr Alex on 16APR at 2:20 and patient will update us early next week to see how she is doing. PCP preferred follow up with pulm due to prednisone use.

## 2025-04-11 NOTE — TELEPHONE ENCOUNTER
----- Message from [employer] sent at 4/11/2025 11:31 AM CDT -----  Who Called: Theresa FamYuan is requesting a sooner appointment. Caller declined first available appointment listed below. Caller will not accept being placed on the waitlist and is requesting a message be sent to doctor.When is the first available appointment?04/16Options offered (Virtual Visit, Urgent Care): Symptoms:cough, trouble breathing Preferred Method of Contact: Phone CallPatient's Preferred Phone Number on File: [phone] Best Call Back Number, if different:Additional Information: patient has a cough and trouble breathing, wanted to know if she could be seen today, [facility] [place] recommended she see Dr Alex because she was on steroids. Patient said if she cannot be seen today can something be called in.

## 2025-04-16 ENCOUNTER — OFFICE VISIT (OUTPATIENT)
Dept: PULMONOLOGY | Facility: CLINIC | Age: 80
End: 2025-04-16
Payer: MEDICARE

## 2025-04-16 VITALS
BODY MASS INDEX: 29.31 KG/M2 | DIASTOLIC BLOOD PRESSURE: 80 MMHG | SYSTOLIC BLOOD PRESSURE: 130 MMHG | RESPIRATION RATE: 16 BRPM | WEIGHT: 175.94 LBS | HEIGHT: 65 IN | OXYGEN SATURATION: 97 % | HEART RATE: 93 BPM

## 2025-04-16 DIAGNOSIS — J45.50 SEVERE PERSISTENT ASTHMA WITHOUT COMPLICATION: Primary | ICD-10-CM

## 2025-04-16 DIAGNOSIS — J82.83 EOSINOPHILIC ASTHMA: ICD-10-CM

## 2025-04-16 PROCEDURE — 99213 OFFICE O/P EST LOW 20 MIN: CPT | Mod: S$PBB,,, | Performed by: STUDENT IN AN ORGANIZED HEALTH CARE EDUCATION/TRAINING PROGRAM

## 2025-04-16 PROCEDURE — 99215 OFFICE O/P EST HI 40 MIN: CPT | Mod: PBBFAC | Performed by: STUDENT IN AN ORGANIZED HEALTH CARE EDUCATION/TRAINING PROGRAM

## 2025-04-16 PROCEDURE — 99999 PR PBB SHADOW E&M-EST. PATIENT-LVL V: CPT | Mod: PBBFAC,,, | Performed by: STUDENT IN AN ORGANIZED HEALTH CARE EDUCATION/TRAINING PROGRAM

## 2025-04-16 RX ORDER — FEXOFENADINE HCL 180 MG/1
180 TABLET ORAL
COMMUNITY
Start: 2025-04-11

## 2025-04-16 NOTE — PROGRESS NOTES
[facility]  Pulmonology  ESTABLISHED VISIT     Patient Name:  Theresa Fam  Primary Care Provider: Ho Brady DO  Date of Service: 04/16/2025    Chief Complaint: Cough    SUBJECTIVE   HPI:  Theresa Fam is a 79 y.o. 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 Baumann), hypothyroidism, chronic urticaria and GERD who presents for follow up of asthma. Last seen 04/2025 with continuation of asthma therapy.    Ms. Fam presents with worsening cough, breathing difficulties, and allergy symptoms that started about a week ago. Symptoms began about a week ago, starting with a mild cough at the beginning of the week. By Thursday night or Friday morning, she had difficulty breathing. She developed severe, painful sneezing episodes, which continued through Friday night, affecting her sleep. By Saturday, the sneezing had subsided. She is still coughing, which she attributes to chest congestion. This morning, she had a coughing fit resulting in expectorating some adherent phlegm, providing some relief. She is taking several medications to manage her symptoms: OTC Allegra for allergies, an OTC mucus relief medication similar to Mucinex, Prednisone (increased from 5mg to 10mg daily since Friday), and a nasal spray. She has been using Trelegy and Albuterol inhalers 3 times daily since Friday. She recently started taking the leftover azithromycin during this time as well. She reports postnasal drip and has started using lemon drops to help with her cough. She is currently receiving Tezspire treatments as shots in the clinic with Dr. Baumann. Her next appointment and shot with Dr. Baumann is scheduled for early May. She denies having sinus pain or any signs of bacterial infection.         Initial HPI  Sarwat reports worsening of her cough symptoms in the past 3 months. Her symptoms initially consistent of coughing paroxysms with associated shortness of breath. This evolved to having coughing  episodes with no sleep interruption. She has a sensation of drainage at the back of her throat. She also has chest congestion. Her coughing episodes now with goal to relieve phlegm with expectorant a small volume green colored sputum. Her symptoms have progressed in severity requiring presentation to the ED 3 times since the start of this year. Outside records have been reviewed in Care Everywhere. She has improvement of symptoms while on steroid therapy as well as antibiotics and antitussives. She has history of CRSsNP. She follows with Allergy medicine where she is treated with Xolair injections since 2022; last Feb 12th 2024. She uses Trelegy daily now and has just received albuterol nebuilizer mediation and equipment. Her home in the past 3 weeks has undergone maintenance of the HVAC system and cleaning of her carpeted rooms.  03/2024: reports having an acute episode overnight with coughing paroxysms and breathlessness. She had to use her nebulizer twice. Her symptoms were worrisome enough for her  to consider bringing her to the ED. She had improvement with breathing treatments. She remains short of breath today.   Review of OSH records notable for her undergoing bronchoscopy 01/05/2024 with culture growing Haemophilus parainfluenza.   05/2024: Sarwat has had an exacerbation in the past month characterized by increase in her coughing episodes and requiring a course of steroids (including dexamethasone) and antibiotics. She was started on Tezspire and has received her 2nd dose.  07/2024: reports episode of coughing that was associated with shortness of breath.  Cough was nonproductive.  Symptoms have improved today when compared to prior.  She was run out of samples of Trelegy and has missed doses of her daily regimen.  We discussed at length her asthma diagnosis and expected symptoms with exacerbations.  She continues on prednisone 10 mg daily.  10/2024: reports feeling improved on this assessment.  She  has had increase in her coughing episode.  She states that she was increased drainage which leaves a sensation of chest congestion. She recently presented to outpatient clinic with 1 week hx of cough and wheezing which was managed with IM steroid.  She feels that this helped with her improvement.  She is currently taking Trelegy daily, albuterol nebulizers and MDI.  With improvement of her nasal congestion, she stopped use of her intranasal Flonase.  She is currently taking prednisone 5 mg daily.  12/2024: reports improvement in her breathing and her cough.  She is continuing on her therapy and is on her last sample of Trelegy inhaler.  She has sent in her paperwork for the pharmacy referral in his awaiting to hear back from them.  She raises concerns of a rash in her lower leg that was itching, however, after a cream is better. Earlier this month, she was treated with a 10 day course of Zyrtec D for increased PND symptoms.   04/2025: reports feeling well on this assessment. She had increased shortness of breath in February that has since improved. She is using rescue inhaler every am. She overall feels improved. She details ongoing work up with Immunology for response to pneumonia vaccine (Ig w/u). She has a persistent rash in her forearms. No hospitalizations since last seen.    Past Medical History:   Diagnosis Date    Asthma     Thyroid disease        Past Surgical History:   Procedure Laterality Date    BRONCHOSCOPY  01/2024    CHOLECYSTECTOMY      EYE SURGERY Bilateral     cataract removal       Family History   Problem Relation Name Age of Onset    Breast cancer Maternal Aunt      Breast cancer Maternal Aunt      Breast cancer Maternal Aunt      Breast cancer Maternal Cousin      Breast cancer Maternal Cousin      Breast cancer Maternal Cousin          Social History     Socioeconomic History    Marital status:    Tobacco Use    Smoking status: Never     Passive exposure: Never    Smokeless tobacco:  "Never   Substance and Sexual Activity    Alcohol use: Never    Drug use: Never    Sexual activity: Yes     Partners: Male       Social History     Social History Narrative    Not on file       Review of patient's allergies indicates:   Allergen Reactions    Amoxil [amoxicillin]     Rocephin [ceftriaxone]         Medications: Medications reviewed to include over the counter medications.    Review of Systems: A focused ROS was completed and found to be negative except for that mentioned above.      OBJECTIVE   PHYSICAL EXAM:  Vitals:    04/16/25 1419   BP: 130/80   BP Location: Left arm   Patient Position: Sitting   Pulse: 93   Resp: 16   SpO2: 97%   Weight: 79.8 kg (175 lb 14.8 oz)   Height: 5' 5" (1.651 m)     GENERAL: NAD  HEENT: normocephalic, non-icteric conjunctivae, moist oral mucosa  RESPIRATORY: clear lungs in posterior and anterior fields, no wheezing, rales or rhonchi, on RA  SKIN: LLE distal with intact skin, improving vague erythema, no purulence, pustules or drainage, skin is intact  MUSCULOSKELETAL: No clubbing or cyanosis; no LE edema  NEUROLOGIC: AO ×3, no gross deficits    LABS:  Lab studies reviewed and notable for IgE 432, SEos 580, Ig Quant panel (IgG,A,M) wnl  OSH CBC 02/2024:  H/H 14.3/44.2, WBC 7.56, serum eosinophils 1000 (13.4%); , , T bili 0.40, , creatinine 1.10, BNP 14.3    BAL DIFF 05/2024:   Latest Reference Range & Units 05/17/24 08:32   Color, Body Fluid  Colorless   Clarity, Body Fluid Clear  Clear   RBC, Body Fluid /cumm <3,000   Cell Count Excluding RBCs <=150 /cumm 74   Polys, Fluid Man % 0 - 25 % 31 (H)   Lymphs, Fluid % 29   Macrophages, Fluid Man % % 13   Monocytes, Fluid Man % % 20   Eos, Fluid % 7     IMAGING:  Imaging reviewed and notable for CT Chest 03/2024 with lingular scarring over fissure with mild cylindical bronchiectasis, no masses,  no emphysema, no pleural effusion       CXR 01/2024 OSH:  FINDINGS:The cardiovascular and mediastinal contours " unremarkable.     OSH CXR 02/2024: Heart size is within normal limits.  Chronic change of lung with a focal consolidation, pleural effusion or pneumothorax.  Visualized osseous with surrounding soft tissue structures appear grossly unchanged.    LUNG FUNCTION TESTING:    SPIROMETRY/PFT:  03/2024 Pre Post   FVC 1.84/-1.86 2.27/-0.94 +BD   FEV1 1.01/-2.84 1.46/-1.69 +BD   FEV1/FVC 55/-2.44 64/-1.52   TLC 3.50/-2.59    FRC  2.05/-1.47    RV 1.34/-2.17    DLCO 13.11/-2.31    NOTE: PFT while in acute exacerbation    ASSESSMENT & PLAN   Assessed respiratory symptoms, likely due to allergic reaction and post-nasal drip rather than asthma exacerbation.  Evaluated throat for signs of infection.  Auscultated lungs.  Determined current allergy medications and Tezspire (biologic therapy) are appropriate for managing symptoms.  Considered recent increase in prednisone dosage and its positive effect.  - Continue Albuterol inhaler, can be used up to 4-6 times per day as needed.  - Increased Prednisone to 10 mg daily for 5-7 days, then attempt reducing back to 5 mg; if symptoms worsen, return to 10 mg.  - Explained the difference between upper respiratory (sinus-related) and lower respiratory (lung-related) coughs.  - Informed patient that oxygen tanks are typically only prescribed when oxygen levels are consistently low.  1. Severe persistent asthma without complication    2. Eosinophilic asthma  Assessment & Plan:  78 yo F with poorly controlled eosinophilic asthma, chronic rhinitis and urticaria (previously on Xolair w/ exacerbations) presents for follow up evaluation with poorly controlled symptoms now on prednisone 10 mg Qday. Her regimen includes Tezspire SQ, Trelegy, montelukast, cetirizine and KVNG PRN. Prior evaluation has included lab work notable for IgE 432, SEos 580; pertinent neg include Aspergillus. Exacerbations have included infectious triggers (Bronch with BAL at OSH 01/2024 with Cx growing Haemophilus  parainfluenza), environmental and undermedication. PFT with impressive BD response. CT Chest 03/2024 with chronic appearing lingula scar with no other overt evidence of airway or parenchymal disease. Now s/p bronchoscopy w/ BAL notable for 7% Eos consistent with bronchial asthma with ongoing inflammatory response (PMN predominant) and negative cultures; bronchoscopy was significant for bronchospasm as well. Constellation of symptoms is consistent with poorly controlled eosinophilic asthma that is now steroid dependent. Plan for management as follows:  - symptoms are well controlled at present  - cont Trelegy 100 Qday with KVNG PRN neb vs MDI  - cont azelastine twice daily and resume Flonase twice daily x1 week and then daily thereafter  - cont montelukast QHS, refill provided  - cont flonase Qday and cetirizine Qday  - cont Prednisone 10 mg Qday  - cont Tezspire, managed with Dr. Baumann at [facility]               This note was generated with the assistance of ambient listening technology. Verbal consent was obtained by the patient and accompanying visitor(s) for the recording of patient appointment to facilitate this note. I attest to having reviewed and edited the generated note for accuracy, though some syntax or spelling errors may persist. Please contact the author of this note for any clarification.         Follow up in about 8 months (around 12/16/2025).    Case was discussed with patient; all questions were answered to patient's satisfaction and patient verbalized understanding.     Angela Alex MD  Pulmonary Medicine  [facility]  Phone: [phone]

## 2025-04-16 NOTE — ASSESSMENT & PLAN NOTE
78 yo F with poorly controlled eosinophilic asthma, chronic rhinitis and urticaria (previously on Xolair w/ exacerbations) presents for follow up evaluation with poorly controlled symptoms now on prednisone 10 mg Qday. Her regimen includes Tezspire SQ, Trelegy, montelukast, cetirizine and KVNG PRN. Prior evaluation has included lab work notable for IgE 432, SEos 580; pertinent neg include Aspergillus. Exacerbations have included infectious triggers (Bronch with BAL at OSH 01/2024 with Cx growing Haemophilus parainfluenza), environmental and undermedication. PFT with impressive BD response. CT Chest 03/2024 with chronic appearing lingula scar with no other overt evidence of airway or parenchymal disease. Now s/p bronchoscopy w/ BAL notable for 7% Eos consistent with bronchial asthma with ongoing inflammatory response (PMN predominant) and negative cultures; bronchoscopy was significant for bronchospasm as well. Constellation of symptoms is consistent with poorly controlled eosinophilic asthma that is now steroid dependent. Plan for management as follows:  - symptoms are well controlled at present  - cont Trelegy 100 Qday with KVNG PRN neb vs MDI  - cont azelastine twice daily and resume Flonase twice daily x1 week and then daily thereafter  - cont montelukast QHS, refill provided  - cont flonase Qday and cetirizine Qday  - cont Prednisone 10 mg Qday  - cont Tezspire, managed with Dr. Baumann at [facility]

## 2025-06-17 DIAGNOSIS — J45.50 SEVERE PERSISTENT ASTHMA WITHOUT COMPLICATION: ICD-10-CM

## 2025-06-17 DIAGNOSIS — J82.83 EOSINOPHILIC ASTHMA: ICD-10-CM

## 2025-06-17 RX ORDER — FLUTICASONE FUROATE, UMECLIDINIUM BROMIDE AND VILANTEROL TRIFENATATE 100; 62.5; 25 UG/1; UG/1; UG/1
1 POWDER RESPIRATORY (INHALATION) DAILY
Qty: 60 EACH | Refills: 11 | Status: SHIPPED | OUTPATIENT
Start: 2025-06-17

## 2025-06-17 NOTE — TELEPHONE ENCOUNTER
Copied from CRM #[id number]. Topic: Medications - Medication Refill  >> Jun 17, 2025 11:38 AM Mars wrote:  Who Called: Theresa Fam    Refill or New Rx:Refill  RX Name and Strength:fluticasone-umeclidin-vilanter (TRELEGY ELLIPTA) 100-62.5-25 mcg DsDv  How is the patient currently taking it? (ex. 1XDay):1xday  Is this a 30 day or 90 day RX:60  Local or Mail Order:local  List of preferred pharmacies on file (remove unneeded): [unfilled]  If different Pharmacy is requested, enter Pharmacy information here including location and phone number:    [place] [id number] [address] [address]  Phone: [phone] Fax: [phone]  Hours: Not open 24 hours        Also she want to know if she has any free sample    Preferred Method of Contact: Phone Call  Patient's Preferred Phone Number on File: [phone]

## 2025-06-20 DIAGNOSIS — L50.8 CHRONIC URTICARIA: ICD-10-CM

## 2025-06-24 RX ORDER — FAMOTIDINE 20 MG/1
20 TABLET, FILM COATED ORAL 2 TIMES DAILY
Qty: 60 TABLET | Refills: 0 | Status: SHIPPED | OUTPATIENT
Start: 2025-06-24

## 2025-07-07 ENCOUNTER — OFFICE VISIT (OUTPATIENT)
Dept: FAMILY MEDICINE | Facility: CLINIC | Age: 80
End: 2025-07-07
Payer: MEDICARE

## 2025-07-07 VITALS
TEMPERATURE: 97 F | BODY MASS INDEX: 27.46 KG/M2 | HEIGHT: 65 IN | SYSTOLIC BLOOD PRESSURE: 136 MMHG | DIASTOLIC BLOOD PRESSURE: 84 MMHG | OXYGEN SATURATION: 95 % | HEART RATE: 72 BPM | WEIGHT: 164.81 LBS

## 2025-07-07 DIAGNOSIS — J30.2 SEASONAL ALLERGIC RHINITIS DUE TO FUNGAL SPORES: ICD-10-CM

## 2025-07-07 DIAGNOSIS — E03.9 HYPOTHYROIDISM, UNSPECIFIED TYPE: ICD-10-CM

## 2025-07-07 DIAGNOSIS — J45.50 SEVERE PERSISTENT ASTHMA WITHOUT COMPLICATION: ICD-10-CM

## 2025-07-07 DIAGNOSIS — J82.83 EOSINOPHILIC ASTHMA: ICD-10-CM

## 2025-07-07 DIAGNOSIS — Z09 FOLLOW-UP EXAM: Primary | ICD-10-CM

## 2025-07-07 DIAGNOSIS — L50.8 CHRONIC URTICARIA: ICD-10-CM

## 2025-07-07 DIAGNOSIS — K21.9 GASTROESOPHAGEAL REFLUX DISEASE, UNSPECIFIED WHETHER ESOPHAGITIS PRESENT: ICD-10-CM

## 2025-07-07 DIAGNOSIS — Z78.0 ASYMPTOMATIC MENOPAUSE: ICD-10-CM

## 2025-07-07 PROCEDURE — 99214 OFFICE O/P EST MOD 30 MIN: CPT | Mod: ,,, | Performed by: INTERNAL MEDICINE

## 2025-07-07 RX ORDER — LEVOTHYROXINE SODIUM 75 UG/1
75 TABLET ORAL
Qty: 90 TABLET | Refills: 1 | Status: SHIPPED | OUTPATIENT
Start: 2025-07-07 | End: 2026-07-07

## 2025-07-07 NOTE — PROGRESS NOTES
Subjective     Patient ID: Theresa Fam is a 80 y.o. female.    Chief Complaint: Follow-up (6m) and Health Maintenance (TETANUS VACCINE Never done/DEXA Scan Never done/Shingles Vaccine(1 of 2) Never done/)    Mrs. Fam is a 80yoF who presents to the clinic today for follow-up. She has a PMHx of hypothyroidism, GERD, eosinophilic asthma.  She reports 2 exacerbations of eosinophilic asthma, one episode occurring at the beginning of April of this year, for which she saw Dr. Alex and was treated. She states her symptoms resolved over 2 weeks with increased prednisone dose to 10mg. She states she returned to her 5mg dose. Her second episode occurred a few weeks ago and resolved after increasing her prednisone to 10mg again. She states she has returned to her 5mg dose. She endorses chronic fatigue and rhinnorhea today, but denies cough, SOB, CP, fevers, nasal congestion, sore throat. She saw Dr. Wynn with Allergy & Immunology on 5/7/25 and states she has an upcoming appointment with Dr. Corea with Dermatology in October of this year. She would like to see ENT before the fall because she reports worsening of her symptoms of cough, sneezing, and nasal drainage during that season.  She states she was not able to fill her Trelogy prescription due to cost. She endorses success with Tezspire for her asthma symptoms, but states she has continued problems with a rash that appears on her BL anterior LE and sometimes on her UE.  She has had some elevated liver enzymes and had an ultrasound of the abdomen, which did not show any abnormalities of the liver.  She is resting comfortably today in no distress. She is afebrile and vital signs are stable.    Follow-up  Associated symptoms include fatigue (chronic) and a rash. Pertinent negatives include no abdominal pain, arthralgias, change in bowel habit, chest pain, chills, congestion, coughing, fever, headaches, joint swelling, myalgias, nausea, neck pain, sore throat, vomiting or  weakness.     Review of Systems   Constitutional:  Positive for fatigue (chronic). Negative for activity change, appetite change, chills, fever and unexpected weight change.   HENT:  Positive for rhinorrhea. Negative for nasal congestion, ear discharge, ear pain, hearing loss, postnasal drip, sinus pressure/congestion, sneezing, sore throat and trouble swallowing.    Eyes:  Negative for pain, redness and visual disturbance.   Respiratory:  Negative for apnea, cough, shortness of breath and wheezing.    Cardiovascular:  Negative for chest pain, palpitations and leg swelling.   Gastrointestinal:  Negative for abdominal pain, change in bowel habit, constipation, diarrhea, nausea and vomiting.   Endocrine: Negative for cold intolerance, heat intolerance and polyuria.   Genitourinary:  Negative for dysuria and hematuria.   Musculoskeletal:  Negative for arthralgias, back pain, joint swelling, myalgias and neck pain.   Integumentary:  Positive for color change and rash. Negative for pallor and wound.   Allergic/Immunologic: Negative for immunocompromised state.   Neurological:  Negative for dizziness, tremors, seizures, weakness, headaches and memory loss.   Hematological:  Negative for adenopathy.   Psychiatric/Behavioral:  Negative for confusion, dysphoric mood and sleep disturbance. The patient is not nervous/anxious.           Objective     Physical Exam  Vitals and nursing note reviewed.   Constitutional:       General: She is not in acute distress.     Appearance: Normal appearance. She is not ill-appearing.   HENT:      Head: Normocephalic and atraumatic.      Right Ear: Tympanic membrane, ear canal and external ear normal. There is no impacted cerumen.      Left Ear: Tympanic membrane, ear canal and external ear normal. There is no impacted cerumen.      Nose: Nose normal. No congestion or rhinorrhea.      Mouth/Throat:      Mouth: Mucous membranes are moist.      Pharynx: Oropharynx is clear. No posterior  oropharyngeal erythema.   Eyes:      Extraocular Movements: Extraocular movements intact.      Conjunctiva/sclera: Conjunctivae normal.      Pupils: Pupils are equal, round, and reactive to light.   Cardiovascular:      Rate and Rhythm: Normal rate and regular rhythm.      Pulses: Normal pulses.      Heart sounds: Normal heart sounds. No murmur heard.  Pulmonary:      Effort: Pulmonary effort is normal. No respiratory distress.      Breath sounds: Normal breath sounds. No wheezing or rales.   Abdominal:      General: Abdomen is flat. Bowel sounds are normal.      Palpations: Abdomen is soft.   Musculoskeletal:         General: Normal range of motion.      Cervical back: Normal range of motion and neck supple.      Right lower leg: No edema.      Left lower leg: No edema.   Skin:     General: Skin is warm and dry.      Capillary Refill: Capillary refill takes less than 2 seconds.      Coloration: Skin is not jaundiced or pale.      Comments: Some discoloration, darkening of BL LE at anterior tibia, dry skin   Neurological:      General: No focal deficit present.      Mental Status: She is alert and oriented to person, place, and time.      Cranial Nerves: No cranial nerve deficit.      Sensory: No sensory deficit.      Motor: No weakness.      Gait: Gait normal.   Psychiatric:         Mood and Affect: Mood normal.         Behavior: Behavior normal.         Thought Content: Thought content normal.         Judgment: Judgment normal.            Assessment and Plan     1. Follow-up exam    2. Seasonal allergic rhinitis due to fungal spores    3. Eosinophilic asthma    4. Severe persistent asthma without complication    5. Hypothyroidism, unspecified type    6. Gastroesophageal reflux disease, unspecified whether esophagitis present    7. Asymptomatic menopause  -     DXA Bone Density Axial Skeleton 1 or more sites; Future; Expected date: 07/07/2025    8. Chronic urticaria    Other orders  -     levothyroxine (SYNTHROID)  75 MCG tablet; Take 1 tablet (75 mcg total) by mouth before breakfast.  Dispense: 90 tablet; Refill: 1      1. Follow-up exam  Synthroid 75mcg tablet will be refilled.  Pt is due for DEXA scan and will be ordered for scheduling today.  She is up-to-date on her mammogram, next screening in September.  She is not seeing improvement in nasal drainage with Mucinex, hold Mucinex for now.    2. Eosinophilic asthma  Pt denies problems with cough, SOB, sneezing lately. She endorses worsening of rash and discoloration of anterior tibia BL.  She reports being unable to fill Trelegy proscription due to cost.  She is seeing Dr. Alex with pulmonology and Dr. Wynn with [facility] Immun[facility].  She is currently taking Tezspire injections, which she states help with asthma symptoms, but have not helped her skin problem.  She has appt to see Dermatology for her urticarial rash in October.    3. Hypothyroidism, unspecified type  Last thyroid levels within normal limits  Pt endorses chronic fatigue and occasional constipation, but no worsening of symptoms. She denies cold intolerance, appetite or weight change.  Will refill and continue 75mcg synthroid.    5. Gastroesophageal reflux disease, unspecified whether esophagitis present  Pt is taking pepcid 20mg PO tablet BID, before meals.     7. Elevated liver enzymes  AST & ALT elevated on 03/14/25, similar to 02/15/2025.  Pt denies alcohol use or problems with alcoholism in the past.  An abdominal ultrasound was completed, which did not show any abnormalities with the liver.  Likely related to fatty liver disease.  We will continue to monitor closely.  May need to get her set up for elastography        Billing based on moderate medical complexity    Follow up in about 6 months (around 1/7/2026).

## 2025-07-29 ENCOUNTER — TELEPHONE (OUTPATIENT)
Dept: FAMILY MEDICINE | Facility: CLINIC | Age: 80
End: 2025-07-29
Payer: MEDICARE

## 2025-07-29 NOTE — TELEPHONE ENCOUNTER
----- Message from Krystal sent at 7/29/2025 11:36 AM CDT -----  Pt saw Dr. Brady on 07/07 and he wanted her to have a bone density test and they would get in touch about an appt. She has not heard anything.

## 2025-08-13 RX ORDER — MONTELUKAST SODIUM 10 MG/1
10 TABLET ORAL
COMMUNITY
Start: 2025-07-26

## 2025-08-15 DIAGNOSIS — J82.83 EOSINOPHILIC ASTHMA: ICD-10-CM

## 2025-08-16 RX ORDER — PREDNISONE 10 MG/1
10 TABLET ORAL DAILY
Qty: 30 TABLET | Refills: 3 | Status: SHIPPED | OUTPATIENT
Start: 2025-08-16

## 2025-08-20 ENCOUNTER — OFFICE VISIT (OUTPATIENT)
Dept: PULMONOLOGY | Facility: CLINIC | Age: 80
End: 2025-08-20
Payer: MEDICARE

## 2025-08-20 VITALS
WEIGHT: 164 LBS | DIASTOLIC BLOOD PRESSURE: 71 MMHG | BODY MASS INDEX: 27.32 KG/M2 | HEART RATE: 78 BPM | SYSTOLIC BLOOD PRESSURE: 139 MMHG | RESPIRATION RATE: 18 BRPM | OXYGEN SATURATION: 95 % | HEIGHT: 65 IN

## 2025-08-20 DIAGNOSIS — J45.50 SEVERE PERSISTENT ASTHMA WITHOUT COMPLICATION: ICD-10-CM

## 2025-08-20 DIAGNOSIS — J82.83 EOSINOPHILIC ASTHMA: Primary | ICD-10-CM

## 2025-08-20 PROCEDURE — 99214 OFFICE O/P EST MOD 30 MIN: CPT | Mod: PBBFAC | Performed by: STUDENT IN AN ORGANIZED HEALTH CARE EDUCATION/TRAINING PROGRAM

## 2025-08-20 PROCEDURE — 99999 PR PBB SHADOW E&M-EST. PATIENT-LVL IV: CPT | Mod: PBBFAC,,, | Performed by: STUDENT IN AN ORGANIZED HEALTH CARE EDUCATION/TRAINING PROGRAM

## 2025-08-20 PROCEDURE — 99212 OFFICE O/P EST SF 10 MIN: CPT | Mod: S$PBB,,, | Performed by: STUDENT IN AN ORGANIZED HEALTH CARE EDUCATION/TRAINING PROGRAM
